# Patient Record
Sex: FEMALE | Race: BLACK OR AFRICAN AMERICAN | Employment: OTHER | URBAN - METROPOLITAN AREA
[De-identification: names, ages, dates, MRNs, and addresses within clinical notes are randomized per-mention and may not be internally consistent; named-entity substitution may affect disease eponyms.]

---

## 2017-03-13 ENCOUNTER — OFFICE VISIT (OUTPATIENT)
Dept: INTERNAL MEDICINE CLINIC | Age: 32
End: 2017-03-13

## 2017-03-13 VITALS
OXYGEN SATURATION: 100 % | BODY MASS INDEX: 27.62 KG/M2 | HEIGHT: 64 IN | HEART RATE: 72 BPM | SYSTOLIC BLOOD PRESSURE: 102 MMHG | RESPIRATION RATE: 20 BRPM | TEMPERATURE: 96.4 F | DIASTOLIC BLOOD PRESSURE: 61 MMHG | WEIGHT: 161.8 LBS

## 2017-03-13 DIAGNOSIS — R53.83 FATIGUE, UNSPECIFIED TYPE: ICD-10-CM

## 2017-03-13 DIAGNOSIS — E66.9 OBESITY (BMI 30.0-34.9): ICD-10-CM

## 2017-03-13 DIAGNOSIS — E04.1 THYROID NODULE: Primary | ICD-10-CM

## 2017-03-13 NOTE — PATIENT INSTRUCTIONS
Thyroid Nodules: Care Instructions  Your Care Instructions  Thyroid nodules are growths or lumps in the thyroid gland. Your thyroid is in the front of your neck. It controls how your body uses energy. You may have tests to see if the nodule is caused by cancer. Most nodules aren't cancer and don't cause problems. Many don't even need treatment. If you do have cancer, it can usually be cured. Treatment will probably include surgery. You may also get radioactive iodine treatment. If your thyroid can't make thyroid hormone after treatment, you can take a pill every day to replace the hormone. Follow-up care is a key part of your treatment and safety. Be sure to make and go to all appointments, and call your doctor if you are having problems. It's also a good idea to know your test results and keep a list of the medicines you take. How can you care for yourself at home? · Be safe with medicines. If you take thyroid hormone medicine:  ¨ Take it exactly as prescribed. Call your doctor if you think you are having a problem with your medicine. If you take the right amount and don't skip doses, you probably won't have side effects. ¨ Do not take it with calcium, vitamins, or iron. ¨ Try not to miss a dose. ¨ Do not take extra doses. This will not help you get better any faster. It may also cause side effects. ¨ Tell your doctor about any medicines you take. This includes over-the-counter medicines. ¨ Wear a medical alert bracelet or necklace that says you take thyroid hormones. You can buy these at most drugstores. When should you call for help? Call 911 anytime you think you may need emergency care. For example, call if:  · You lose consciousness. Call your doctor now or seek immediate medical care if:  · You have shortness of breath. Watch closely for changes in your health, and be sure to contact your doctor if:  · You have pain in your neck, jaw, or ear. · You have problems swallowing.   · You have a \"tickle\" in your throat. · You feel weak and tired. · You have nervousness, a fast heartbeat, hand tremors, problems sleeping, increased sweating, and weight loss. · You do not feel better even though you are taking your medicine. Where can you learn more? Go to http://jacobo-iveth.info/. Enter I963 in the search box to learn more about \"Thyroid Nodules: Care Instructions. \"  Current as of: July 28, 2016  Content Version: 11.1  © 4616-7713 [employer]. Care instructions adapted under license by [employer] (which disclaims liability or warranty for this information). If you have questions about a medical condition or this instruction, always ask your healthcare professional. [address] any warranty or liability for your use of this information.

## 2017-03-13 NOTE — MR AVS SNAPSHOT
Visit Information Date & Time Provider Department Dept. Phone Encounter #  
 3/13/2017  9:45 AM Maximilian Posadas MD [facility] Internal Medicine [phone] [id number] Upcoming Health Maintenance Date Due DTaP/Tdap/Td series (1 - Tdap) 10/19/2007 PAP AKA CERVICAL CYTOLOGY 10/19/2007 INFLUENZA AGE 9 TO ADULT 8/1/2016 Allergies as of 3/13/2017  Review Complete On: 3/13/2017 By: Maximilian Posadas MD  
 No Known Allergies Current Immunizations  Never Reviewed Name Date MMR Vaccine 1/7/2012  2:20 PM  
  
 Not reviewed this visit You Were Diagnosed With   
  
 Codes Comments Thyroid nodule    -  Primary ICD-10-CM: E04.1 ICD-9-CM: 241.0 Obesity (BMI 30.0-34.9)     ICD-10-CM: Y80.4 ICD-9-CM: 278.00 Fatigue, unspecified type     ICD-10-CM: R53.83 ICD-9-CM: 780.79 Vitals BP Pulse Temp Resp Height(growth percentile) Weight(growth percentile) 102/61 (BP 1 Location: Left arm, BP Patient Position: Sitting) 72 96.4 °F (35.8 °C) (Oral) 20 5' 4\" (1.626 m) 161 lb 12.8 oz (73.4 kg) LMP SpO2 BMI OB Status Smoking Status 02/26/2017 100% 27.77 kg/m2 Unknown Never Smoker Vitals History BMI and BSA Data Body Mass Index Body Surface Area  
 27.77 kg/m 2 1.82 m 2 Preferred Pharmacy Pharmacy Name Phone [id number], [place] - [address] [phone] Your Updated Medication List  
  
   
This list is accurate as of: 3/13/17 10:48 AM.  Always use your most recent med list.  
  
  
  
  
 cyclobenzaprine 10 mg tablet Commonly known as:  FLEXERIL Take 1 Tab by mouth three (3) times daily as needed for Muscle Spasm(s). oxyCODONE-acetaminophen 5-325 mg per tablet Commonly known as:  PERCOCET Take 1 Tab by mouth every four (4) hours as needed for Pain. Max Daily Amount: 6 Tabs. We Performed the Following CBC WITH AUTOMATED DIFF [88052 CPT(R)] METABOLIC PANEL, COMPREHENSIVE [71424 CPT(R)] T4, FREE [id number] CPT(R)] TSH 3RD GENERATION [04430 CPT(R)] To-Do List   
 05/13/2017 Imaging:  US THYROID/PARATHYROID/SOFT TISS Patient Instructions Thyroid Nodules: Care Instructions Your Care Instructions Thyroid nodules are growths or lumps in the thyroid gland. Your thyroid is in the front of your neck. It controls how your body uses energy. You may have tests to see if the nodule is caused by cancer. Most nodules aren't cancer and don't cause problems. Many don't even need treatment. If you do have cancer, it can usually be cured. Treatment will probably include surgery. You may also get radioactive iodine treatment. If your thyroid can't make thyroid hormone after treatment, you can take a pill every day to replace the hormone. Follow-up care is a key part of your treatment and safety. Be sure to make and go to all appointments, and call your doctor if you are having problems. It's also a good idea to know your test results and keep a list of the medicines you take. How can you care for yourself at home? · Be safe with medicines. If you take thyroid hormone medicine: ¨ Take it exactly as prescribed. Call your doctor if you think you are having a problem with your medicine. If you take the right amount and don't skip doses, you probably won't have side effects. ¨ Do not take it with calcium, vitamins, or iron. ¨ Try not to miss a dose. ¨ Do not take extra doses. This will not help you get better any faster. It may also cause side effects. ¨ Tell your doctor about any medicines you take. This includes over-the-counter medicines. ¨ Wear a medical alert bracelet or necklace that says you take thyroid hormones. You can buy these at most drugstores. When should you call for help? Call 911 anytime you think you may need emergency care. For example, call if: 
· You lose consciousness. Call your doctor now or seek immediate medical care if: 
· You have shortness of breath. Watch closely for changes in your health, and be sure to contact your doctor if: 
· You have pain in your neck, jaw, or ear. · You have problems swallowing. · You have a \"tickle\" in your throat. · You feel weak and tired. · You have nervousness, a fast heartbeat, hand tremors, problems sleeping, increased sweating, and weight loss. · You do not feel better even though you are taking your medicine. Where can you learn more? Go to http://jacobo-iveth.info/. Enter F597 in the search box to learn more about \"Thyroid Nodules: Care Instructions. \" Current as of: July 28, 2016 Content Version: 11.1 © 0904-5020 [employer]. Care instructions adapted under license by "[employer] (which disclaims liability or warranty for this information). If you have questions about a medical condition or this instruction, always ask your healthcare professional. [place] [address] any warranty or liability for your use of this information. Introducing [facility]! Laura Mccormick introduces [employer] patient portal. Now you can access parts of your medical record, email your doctor's office, and request medication refills online. 1. In your internet browser, go to [email or web address] 2. Click on the First Time User? Click Here link in the Sign In box. You will see the New Member Sign Up page. 3. Enter your [employer] Access Code exactly as it appears below. You will not need to use this code after youve completed the sign-up process. If you do not sign up before the expiration date, you must request a new code. · [employer] Access Code: [email or web address] Expires: 6/11/2017 10:48 AM 
 
4. Enter the last four digits of your Social Security Number (xxxx) and Date of Birth (mm/dd/yyyy) as indicated and click Submit.  You will be taken to the next sign-up page. 5. Create a [employer] ID. This will be your [employer] login ID and cannot be changed, so think of one that is secure and easy to remember. 6. Create a [employer] password. You can change your password at any time. 7. Enter your Password Reset Question and Answer. This can be used at a later time if you forget your password. 8. Enter your e-mail address. You will receive e-mail notification when new information is available in [address]. 9. Click Sign Up. You can now view and download portions of your medical record. 10. Click the Download Summary menu link to download a portable copy of your medical information. If you have questions, please visit the Frequently Asked Questions section of the [employer] website. Remember, [employer] is NOT to be used for urgent needs. For medical emergencies, dial 911. Now available from your iPhone and Android! Please provide this summary of care documentation to your next provider. Your primary care clinician is listed as Mandy Billingsley. If you have any questions after today's visit, please call [phone].

## 2017-03-13 NOTE — LETTER
4/21/2017 2:29 PM 
 
Ms. Price Londono [address] [facility] [address] Dear Price Caicedo: Please find your most recent results below. Resulted Orders CBC WITH AUTOMATED DIFF Result Value Ref Range WBC 4.2 3.4 - 10.8 x10E3/uL  
 RBC 4.05 3.77 - 5.28 x10E6/uL HGB 12.5 11.1 - 15.9 g/dL HCT 38.0 34.0 - 46.6 % MCV 94 79 - 97 fL  
 MCH 30.9 26.6 - 33.0 pg  
 MCHC 32.9 31.5 - 35.7 g/dL  
 RDW 13.3 12.3 - 15.4 % PLATELET 860 498 - 628 x10E3/uL NEUTROPHILS 56 % Lymphocytes 35 % MONOCYTES 8 % EOSINOPHILS 1 % BASOPHILS 0 %  
 ABS. NEUTROPHILS 2.4 1.4 - 7.0 x10E3/uL Abs Lymphocytes 1.5 0.7 - 3.1 x10E3/uL  
 ABS. MONOCYTES 0.4 0.1 - 0.9 x10E3/uL  
 ABS. EOSINOPHILS 0.0 0.0 - 0.4 x10E3/uL  
 ABS. BASOPHILS 0.0 0.0 - 0.2 x10E3/uL IMMATURE GRANULOCYTES 0 %  
 ABS. IMM. GRANS. 0.0 0.0 - 0.1 x10E3/uL Narrative Performed at:  [address]  [id number] : Carolina Pedersen MD, Phone:  [phone] METABOLIC PANEL, COMPREHENSIVE Result Value Ref Range Glucose 77 65 - 99 mg/dL BUN 10 6 - 20 mg/dL Creatinine 0.84 0.57 - 1.00 mg/dL GFR est non-AA 94 >59 mL/min/1.73 GFR est  >59 mL/min/1.73  
 BUN/Creatinine ratio 12 8 - 20 Sodium 141 134 - 144 mmol/L Potassium 4.8 3.5 - 5.2 mmol/L Chloride 103 96 - 106 mmol/L  
 CO2 20 18 - 29 mmol/L Calcium 9.5 8.7 - 10.2 mg/dL Protein, total 7.2 6.0 - 8.5 g/dL Albumin 4.6 3.5 - 5.5 g/dL GLOBULIN, TOTAL 2.6 1.5 - 4.5 g/dL A-G Ratio 1.8 1.2 - 2.2 Comment: **Please note reference interval change** Bilirubin, total 0.3 0.0 - 1.2 mg/dL Alk. phosphatase 49 39 - 117 IU/L  
 AST (SGOT) 61 (H) 0 - 40 IU/L  
 ALT (SGPT) 47 (H) 0 - 32 IU/L Narrative Performed at:  [address]  [id number] : Carolina Pedersen MD, Phone:  [phone] TSH 3RD GENERATION Result Value Ref Range TSH 0.649 0.450 - 4.500 uIU/mL Narrative Performed at:  [address]  [id number] : Sola Johnson MD, Phone:  [phone] T4, FREE Result Value Ref Range T4, Free 1.12 0.82 - 1.77 ng/dL Narrative Performed at:  [address]  [id number] : Sola Johnson MD, Phone:  [phone] RECOMMENDATIONS: 
[facility] your labs indicate that your liver functions are slightly elevated, please avoid tylenol and alcohol and we will repeat your labs. I have enclosed that lab slip for your convenience. Please call me if you have any questions: [phone] Sincerely, Jay Arreola MD

## 2017-03-13 NOTE — PROGRESS NOTES
Health Maintenance Due   Topic Date Due    DTaP/Tdap/Td series (1 - Tdap) 10/19/2007    PAP AKA CERVICAL CYTOLOGY  10/19/2007    INFLUENZA AGE 9 TO ADULT  08/01/2016       Chief Complaint   Patient presents with    Follow-up     6 month    Thyroid Problem     hx of thyromegaly       1. Have you been to the ER, urgent care clinic since your last visit? Hospitalized since your last visit? No    2. Have you seen or consulted any other health care providers outside of the [address] [place] [address] since your last visit? Include any pap smears or colon screening. No    3) Do you have an Advance Directive on file? no    4) Are you interested in receiving information on Advance Directives? NO      Patient is accompanied by self I have received verbal consent from [facility] to discuss any/all medical information while they are present in the room.

## 2017-03-13 NOTE — PROGRESS NOTES
HISTORY OF PRESENT ILLNESS  Ynes Brown is a 28 y.o. female here for follow up. Reports to have thyroid nodule. no pain. worred about t.  reports fatigue too. She has started watching diet. Katja Han lost 4 pound weight. Follow-up     Thyroid Problem         Review of Systems   Constitutional: Positive for malaise/fatigue. HENT: Negative. Eyes: Negative. Respiratory: Negative. Cardiovascular: Negative. Gastrointestinal: Negative. Genitourinary: Negative. Musculoskeletal: Negative. Negative for falls. Skin: Negative. Neurological: Negative. Endo/Heme/Allergies: Negative. Psychiatric/Behavioral: Negative. Physical Exam   Constitutional: She appears well-developed and well-nourished. She appears distressed. Neck: Normal range of motion. Neck supple. No JVD present. Thyromegaly present. aprox 2x2 cm size thyroid nodule felt on left side. smooth,soft,NT   Cardiovascular: Normal rate, regular rhythm, normal heart sounds and intact distal pulses. Pulmonary/Chest: Effort normal and breath sounds normal. No respiratory distress. She has no wheezes. She has no rales. Musculoskeletal:   Lower back;spine NT,paravertebral muscle spasm and tenderness. ROM restricted   Psychiatric: She has a normal mood and affect. Her behavior is normal.       ASSESSMENT and PLAN  Vicky Badillo was seen today for follow-up and thyroid problem. Diagnoses and all orders for this visit:    Thyroid nodule    Will check,  -     CBC WITH AUTOMATED DIFF  -     US THYROID/PARATHYROID/SOFT TISS; Future    Obesity (BMI 30.0-34.9)      1. Consume 3 meals per day, do not skip meals. 2. Chose low fat, portion controlled foods for snack and desserts such as low fat chocolate pudding, low fat flavored yogurt, 100 calorie snack packs, etc.   3. Increase moderate intensity exercise to 30 minutes at least 5 times per week.    4. Read nutrition facts labels to identify foods that are lean, extra lean and low fat  The patient is asked to make an attempt to improve diet and exercise patterns to aid in medical management of this problem. -     CBC WITH AUTOMATED DIFF  -     METABOLIC PANEL, COMPREHENSIVE    Fatigue, unspecified type    Will do,  -     CBC WITH AUTOMATED DIFF  -     METABOLIC PANEL, COMPREHENSIVE  -     TSH 3RD GENERATION  -     T4, FREE      Discussed expected course/resolution/complications of diagnosis in detail with patient. Medication risks/benefits/costs/interactions/alternatives discussed with patient. Pt was given an after visit summary which includes diagnoses, current medications & vitals. Pt expressed understanding with the diagnosis and plan.

## 2017-03-14 LAB
ALBUMIN SERPL-MCNC: 4.6 G/DL (ref 3.5–5.5)
ALBUMIN/GLOB SERPL: 1.8 {RATIO} (ref 1.2–2.2)
ALP SERPL-CCNC: 49 IU/L (ref 39–117)
ALT SERPL-CCNC: 47 IU/L (ref 0–32)
AST SERPL-CCNC: 61 IU/L (ref 0–40)
BASOPHILS # BLD AUTO: 0 X10E3/UL (ref 0–0.2)
BASOPHILS NFR BLD AUTO: 0 %
BILIRUB SERPL-MCNC: 0.3 MG/DL (ref 0–1.2)
BUN SERPL-MCNC: 10 MG/DL (ref 6–20)
BUN/CREAT SERPL: 12 (ref 8–20)
CALCIUM SERPL-MCNC: 9.5 MG/DL (ref 8.7–10.2)
CHLORIDE SERPL-SCNC: 103 MMOL/L (ref 96–106)
CO2 SERPL-SCNC: 20 MMOL/L (ref 18–29)
CREAT SERPL-MCNC: 0.84 MG/DL (ref 0.57–1)
EOSINOPHIL # BLD AUTO: 0 X10E3/UL (ref 0–0.4)
EOSINOPHIL NFR BLD AUTO: 1 %
ERYTHROCYTE [DISTWIDTH] IN BLOOD BY AUTOMATED COUNT: 13.3 % (ref 12.3–15.4)
GLOBULIN SER CALC-MCNC: 2.6 G/DL (ref 1.5–4.5)
GLUCOSE SERPL-MCNC: 77 MG/DL (ref 65–99)
HCT VFR BLD AUTO: 38 % (ref 34–46.6)
HGB BLD-MCNC: 12.5 G/DL (ref 11.1–15.9)
IMM GRANULOCYTES # BLD: 0 X10E3/UL (ref 0–0.1)
IMM GRANULOCYTES NFR BLD: 0 %
LYMPHOCYTES # BLD AUTO: 1.5 X10E3/UL (ref 0.7–3.1)
LYMPHOCYTES NFR BLD AUTO: 35 %
MCH RBC QN AUTO: 30.9 PG (ref 26.6–33)
MCHC RBC AUTO-ENTMCNC: 32.9 G/DL (ref 31.5–35.7)
MCV RBC AUTO: 94 FL (ref 79–97)
MONOCYTES # BLD AUTO: 0.4 X10E3/UL (ref 0.1–0.9)
MONOCYTES NFR BLD AUTO: 8 %
NEUTROPHILS # BLD AUTO: 2.4 X10E3/UL (ref 1.4–7)
NEUTROPHILS NFR BLD AUTO: 56 %
PLATELET # BLD AUTO: 201 X10E3/UL (ref 150–379)
POTASSIUM SERPL-SCNC: 4.8 MMOL/L (ref 3.5–5.2)
PROT SERPL-MCNC: 7.2 G/DL (ref 6–8.5)
RBC # BLD AUTO: 4.05 X10E6/UL (ref 3.77–5.28)
SODIUM SERPL-SCNC: 141 MMOL/L (ref 134–144)
T4 FREE SERPL-MCNC: 1.12 NG/DL (ref 0.82–1.77)
TSH SERPL DL<=0.005 MIU/L-ACNC: 0.65 UIU/ML (ref 0.45–4.5)
WBC # BLD AUTO: 4.2 X10E3/UL (ref 3.4–10.8)

## 2017-03-18 ENCOUNTER — HOSPITAL ENCOUNTER (OUTPATIENT)
Dept: ULTRASOUND IMAGING | Age: 32
Discharge: HOME | End: 2017-03-18
Attending: INTERNAL MEDICINE
Payer: COMMERCIAL

## 2017-03-18 DIAGNOSIS — E04.1 THYROID NODULE: ICD-10-CM

## 2017-03-18 PROCEDURE — 76536 US EXAM OF HEAD AND NECK: CPT

## 2017-03-18 NOTE — LETTER
4/21/2017 2:28 PM 
 
Ms. Nikos Londono [address] [facility] [address] Dear Nikos Ramos: Please find your most recent results below. Resulted Orders US THYROID/PARATHYROID/SOFT TISS Narrative EXAM:  US THYROID/PARATHYROID/SOFT TISS INDICATION: Thyroid nodule. COMPARISON: None. TECHNIQUE: Real-time sonography of the thyroid gland was performed with a high 
frequency linear transducer. Multiple static images were obtained. FINDINGS: 
The thyroid is diffusely enlarged. Additionally, within the medial aspect of the 
left lobe, there is a dominant thyroid nodule measuring 2.2 x 2.1 x 1.2 cm, with 
mixed internal echogenicity; portions are isoechoic to background, and other 
portions are anechoic. The right lobe measures 5.5 x 2.3 x 1.8 cm and the left lobe measures 5.7 x 2.5 
x 2.1 cm. The isthmus measures 8 mm. Impression IMPRESSION:  
1. Diffuse enlargement of the thyroid, consistent with goiter. 2. Additionally, dominant nodule in the left lobe, mixed solid and cystic, 
measuring 2.2 cm. RECOMMENDATIONS: 
Nikos Ramos your ultrasound indicates a goiter and cyst, as per your conversation with my nurse, please make appointment with Dr Dali Loya, endocrinology. Please call me if you have any questions: [phone] Sincerely, [facility] 1

## 2017-04-21 DIAGNOSIS — R74.01 ELEVATED AST (SGOT): Primary | ICD-10-CM

## 2017-04-21 DIAGNOSIS — R74.01 ELEVATED ALT MEASUREMENT: ICD-10-CM

## 2017-04-21 DIAGNOSIS — E01.0 THYROMEGALY: Primary | ICD-10-CM

## 2017-05-10 ENCOUNTER — TELEPHONE (OUTPATIENT)
Dept: INTERNAL MEDICINE CLINIC | Age: 32
End: 2017-05-10

## 2017-05-10 NOTE — TELEPHONE ENCOUNTER
Patient called stating dE Paul referred her to Thuy Gaona he didn't have any appt's til sept want to know if she can be referred to someone else she can be reached at [phone]

## 2017-05-21 DIAGNOSIS — R74.01 ELEVATED ALT MEASUREMENT: ICD-10-CM

## 2017-05-21 DIAGNOSIS — R74.01 ELEVATED AST (SGOT): ICD-10-CM

## 2017-08-03 ENCOUNTER — OFFICE VISIT (OUTPATIENT)
Dept: ENDOCRINOLOGY | Age: 32
End: 2017-08-03

## 2017-08-03 VITALS
BODY MASS INDEX: 23.93 KG/M2 | HEART RATE: 65 BPM | WEIGHT: 140.2 LBS | DIASTOLIC BLOOD PRESSURE: 61 MMHG | HEIGHT: 64 IN | SYSTOLIC BLOOD PRESSURE: 92 MMHG

## 2017-08-03 DIAGNOSIS — E04.1 THYROID NODULE: Primary | ICD-10-CM

## 2017-08-03 NOTE — PATIENT INSTRUCTIONS
Thyroid Nodules: Care Instructions  Your Care Instructions  Thyroid nodules are growths or lumps in the thyroid gland. Your thyroid is in the front of your neck. It controls how your body uses energy. You may have tests to see if the nodule is caused by cancer. Most nodules aren't cancer and don't cause problems. Many don't even need treatment. If you do have cancer, it can usually be cured. Treatment will probably include surgery. You may also get radioactive iodine treatment. If your thyroid can't make thyroid hormone after treatment, you can take a pill every day to replace the hormone. Follow-up care is a key part of your treatment and safety. Be sure to make and go to all appointments, and call your doctor if you are having problems. It's also a good idea to know your test results and keep a list of the medicines you take. How can you care for yourself at home? · Be safe with medicines. If you take thyroid hormone medicine:  ¨ Take it exactly as prescribed. Call your doctor if you think you are having a problem with your medicine. If you take the right amount and don't skip doses, you probably won't have side effects. ¨ Do not take it with calcium, vitamins, or iron. ¨ Try not to miss a dose. ¨ Do not take extra doses. This will not help you get better any faster. It may also cause side effects. ¨ Tell your doctor about any medicines you take. This includes over-the-counter medicines. ¨ Wear a medical alert bracelet or necklace that says you take thyroid hormones. You can buy these at most drugstores. When should you call for help? Call 911 anytime you think you may need emergency care. For example, call if:  · You lose consciousness. Call your doctor now or seek immediate medical care if:  · You have shortness of breath. Watch closely for changes in your health, and be sure to contact your doctor if:  · You have pain in your neck, jaw, or ear. · You have problems swallowing.   · You feel weak and tired. · You have nervousness, a fast heartbeat, hand tremors, problems sleeping, increased sweating, and weight loss. · You do not feel better even though you are taking your medicine. Where can you learn more? Go to http://jacobo-iveth.info/. Enter J393 in the search box to learn more about \"Thyroid Nodules: Care Instructions. \"  Current as of: January 31, 2017  Content Version: 11.3  © 3202-3580 [employer]. Care instructions adapted under license by [employer] (which disclaims liability or warranty for this information). If you have questions about a medical condition or this instruction, always ask your healthcare professional. [place] [address] any warranty or liability for your use of this information. Fine-Needle Thyroid Biopsy: Before Your Procedure  What is a needle thyroid biopsy? During a thyroid biopsy, your doctor uses a thin needle to remove a small sample of tissue from your thyroid gland. You may be having the biopsy to find what is causing a lump or growth in your thyroid. The biopsy causes very little pain. But your doctor may need to put the needle into your thyroid more than once. This is done to be sure enough fluid and tissue is taken for the test.  The doctor then looks at the tissue sample under a microscope for cancer, infection, or other thyroid problems. The biopsy is done in a hospital, a clinic, or your doctor's office. During the test, you will lie on your back with a pillow under your shoulders. Your head will be tipped backward and your neck extended. This position pushes the thyroid gland forward. This makes it easier to do the biopsy. You may be given medicine to help you relax. Your doctor may use an ultrasound to guide the placement of the needle. It is important to lie very still during the biopsy. Do not cough, talk, or swallow when the needle is in place.   In some cases, thyroid surgery may be needed if a needle biopsy doesn't give a clear result. This would be done at a different time. In this surgery, the doctor takes a tissue sample through a cut (incision) in the skin. Follow-up care is a key part of your treatment and safety. Be sure to make and go to all appointments, and call your doctor if you are having problems. It's also a good idea to know your test results and keep a list of the medicines you take. What happens before the procedure? Procedures can be stressful. This information will help you understand what you can expect. And it will help you safely prepare for your procedure. You do not need to do anything before your biopsy. You will be awake during the biopsy. Preparing for the procedure  · Understand exactly what procedure is planned, along with the risks, benefits, and other options. · Tell your doctors ALL the medicines, vitamins, supplements, and herbal remedies you take. Some of these can increase the risk of bleeding or interact with anesthesia. · If you take blood thinners, such as warfarin (Coumadin), clopidogrel (Plavix), or aspirin, be sure to talk to your doctor. He or she will tell you if you should stop taking these medicines before your procedure. Make sure that you understand exactly what your doctor wants you to do. · Your doctor will tell you which medicines to take or stop before your procedure. You may need to stop taking certain medicines a week or more before the procedure. So talk to your doctor as soon as you can. · If you have an advance directive, let your doctor know. It may include a living will and a durable power of  for health care. Bring a copy to the hospital. If you don't have one, you may want to prepare one. It lets your doctor and loved ones know your health care wishes. Doctors advise that everyone prepare these papers before any type of surgery or procedure. What happens on the day of the procedure?   · Follow the instructions exactly about when to stop eating and drinking. If you don't, your procedure may be canceled. If your doctor told you to take your medicines on the day of the procedure, take them with only a sip of water. · Take a bath or shower before you come in for your procedure. Do not apply lotions, perfumes, deodorants, or nail polish. · Take off all jewelry and piercings. And take out contact lenses, if you wear them. At the hospital or surgery center  · Bring a picture ID. · The procedure will take about 5 to 10 minutes. Going home  · You may need to stay in the hospital overnight. · Be sure you have someone to drive you home. Anesthesia and pain medicine make it unsafe for you to drive. · You will be given more specific instructions about recovering from your procedure. They will cover things like diet, wound care, follow-up care, driving, and getting back to your normal routine. When should you call your doctor? · You have questions or concerns. · You don't understand how to prepare for your procedure. · You become ill before the procedure (such as fever, flu, or a cold). · You need to reschedule or have changed your mind about having the procedure. Where can you learn more? Go to http://jacobo-iveth.info/. Enter J510 in the search box to learn more about \"Fine-Needle Thyroid Biopsy: Before Your Procedure. \"  Current as of: July 28, 2016  Content Version: 11.3  © 6073-8440 [employer]. Care instructions adapted under license by [employer] (which disclaims liability or warranty for this information). If you have questions about a medical condition or this instruction, always ask your healthcare professional. [address] any warranty or liability for your use of this information.

## 2017-08-03 NOTE — LETTER
8/3/2017 1:40 PM 
 
Patient:  Johan Dupont YOB: 1985 Date of Visit: 8/3/2017 Dear Elizabeth Vazquez MD 
[address] [address] VIA In Basket 
 : Thank you for referring Ms. Johan Dupont to me for evaluation/treatment. Below are the relevant portions of my assessment and plan of care. If you have questions, please do not hesitate to call me. I look forward to following Ms. Praveen Swain along with you. Sincerely, Paulino Myers MD
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
## 2017-08-03 NOTE — MR AVS SNAPSHOT
Visit Information Date & Time Provider Department Dept. Phone Encounter #  
 8/3/2017  1:30 PM Nunu Miller, [address] [facility] Diabetes and Endocrinology [phone] Follow-up Instructions Return in about 6 months (around 2/3/2018). Upcoming Health Maintenance Date Due DTaP/Tdap/Td series (1 - Tdap) 10/19/2007 PAP AKA CERVICAL CYTOLOGY 10/19/2007 INFLUENZA AGE 9 TO ADULT 8/1/2017 Allergies as of 8/3/2017  Review Complete On: 8/3/2017 By: Jeanette Hearn LPN No Known Allergies Current Immunizations  Never Reviewed Name Date MMR Vaccine 1/7/2012  2:20 PM  
  
 Not reviewed this visit You Were Diagnosed With   
  
 Codes Comments Thyroid nodule    -  Primary ICD-10-CM: E04.1 ICD-9-CM: 241.0 Vitals BP Pulse Height(growth percentile) Weight(growth percentile) BMI OB Status 92/61 65 5' 4\" (1.626 m) 140 lb 3.2 oz (63.6 kg) 24.07 kg/m2 Unknown Smoking Status Never Smoker Vitals History BMI and BSA Data Body Mass Index Body Surface Area 24.07 kg/m 2 1.69 m 2 Preferred Pharmacy Pharmacy Name Phone [id number] [address] [phone] Your Updated Medication List  
  
Notice  As of 8/3/2017  1:54 PM  
 You have not been prescribed any medications. Follow-up Instructions Return in about 6 months (around 2/3/2018). To-Do List   
 08/03/2017 Imaging:  US GUIDE FINE NDL ASP W IMAGE Patient Instructions Thyroid Nodules: Care Instructions Your Care Instructions Thyroid nodules are growths or lumps in the thyroid gland. Your thyroid is in the front of your neck. It controls how your body uses energy. You may have tests to see if the nodule is caused by cancer. Most nodules aren't cancer and don't cause problems. Many don't even need treatment. If you do have cancer, it can usually be cured. Treatment will probably include surgery. You may also get radioactive iodine treatment. If your thyroid can't make thyroid hormone after treatment, you can take a pill every day to replace the hormone. Follow-up care is a key part of your treatment and safety. Be sure to make and go to all appointments, and call your doctor if you are having problems. It's also a good idea to know your test results and keep a list of the medicines you take. How can you care for yourself at home? · Be safe with medicines. If you take thyroid hormone medicine: ¨ Take it exactly as prescribed. Call your doctor if you think you are having a problem with your medicine. If you take the right amount and don't skip doses, you probably won't have side effects. ¨ Do not take it with calcium, vitamins, or iron. ¨ Try not to miss a dose. ¨ Do not take extra doses. This will not help you get better any faster. It may also cause side effects. ¨ Tell your doctor about any medicines you take. This includes over-the-counter medicines. ¨ Wear a medical alert bracelet or necklace that says you take thyroid hormones. You can buy these at most drugstores. When should you call for help? Call 911 anytime you think you may need emergency care. For example, call if: 
· You lose consciousness. Call your doctor now or seek immediate medical care if: 
· You have shortness of breath. Watch closely for changes in your health, and be sure to contact your doctor if: 
· You have pain in your neck, jaw, or ear. · You have problems swallowing. · You feel weak and tired. · You have nervousness, a fast heartbeat, hand tremors, problems sleeping, increased sweating, and weight loss. · You do not feel better even though you are taking your medicine. Where can you learn more? Go to http://jacobo-iveth.info/.  
Enter F781 in the search box to learn more about \"Thyroid Nodules: Care Instructions. \" Current as of: January 31, 2017 Content Version: 11.3 © 9602-9921 [employer]. Care instructions adapted under license by [employer] (which disclaims liability or warranty for this information). If you have questions about a medical condition or this instruction, always ask your healthcare professional. [address] any warranty or liability for your use of this information. Fine-Needle Thyroid Biopsy: Before Your Procedure What is a needle thyroid biopsy? During a thyroid biopsy, your doctor uses a thin needle to remove a small sample of tissue from your thyroid gland. You may be having the biopsy to find what is causing a lump or growth in your thyroid. The biopsy causes very little pain. But your doctor may need to put the needle into your thyroid more than once. This is done to be sure enough fluid and tissue is taken for the test. 
The doctor then looks at the tissue sample under a microscope for cancer, infection, or other thyroid problems. The biopsy is done in a hospital, a clinic, or your doctor's office. During the test, you will lie on your back with a pillow under your shoulders. Your head will be tipped backward and your neck extended. This position pushes the thyroid gland forward. This makes it easier to do the biopsy. You may be given medicine to help you relax. Your doctor may use an ultrasound to guide the placement of the needle. It is important to lie very still during the biopsy. Do not cough, talk, or swallow when the needle is in place. In some cases, thyroid surgery may be needed if a needle biopsy doesn't give a clear result. This would be done at a different time. In this surgery, the doctor takes a tissue sample through a cut (incision) in the skin. Follow-up care is a key part of your treatment and safety.  Be sure to make and go to all appointments, and call your doctor if you are having problems. It's also a good idea to know your test results and keep a list of the medicines you take. What happens before the procedure? Procedures can be stressful. This information will help you understand what you can expect. And it will help you safely prepare for your procedure. You do not need to do anything before your biopsy. You will be awake during the biopsy. Preparing for the procedure · Understand exactly what procedure is planned, along with the risks, benefits, and other options. · Tell your doctors ALL the medicines, vitamins, supplements, and herbal remedies you take. Some of these can increase the risk of bleeding or interact with anesthesia. · If you take blood thinners, such as warfarin (Coumadin), clopidogrel (Plavix), or aspirin, be sure to talk to your doctor. He or she will tell you if you should stop taking these medicines before your procedure. Make sure that you understand exactly what your doctor wants you to do. · Your doctor will tell you which medicines to take or stop before your procedure. You may need to stop taking certain medicines a week or more before the procedure. So talk to your doctor as soon as you can. · If you have an advance directive, let your doctor know. It may include a living will and a durable power of  for health care. Bring a copy to the hospital. If you don't have one, you may want to prepare one. It lets your doctor and loved ones know your health care wishes. Doctors advise that everyone prepare these papers before any type of surgery or procedure. What happens on the day of the procedure? · Follow the instructions exactly about when to stop eating and drinking. If you don't, your procedure may be canceled. If your doctor told you to take your medicines on the day of the procedure, take them with only a sip of water. · Take a bath or shower before you come in for your procedure. Do not apply lotions, perfumes, deodorants, or nail polish. · Take off all jewelry and piercings. And take out contact lenses, if you wear them. At the hospital or surgery center · Bring a picture ID. · The procedure will take about 5 to 10 minutes. Going home · You may need to stay in the hospital overnight. · Be sure you have someone to drive you home. Anesthesia and pain medicine make it unsafe for you to drive. · You will be given more specific instructions about recovering from your procedure. They will cover things like diet, wound care, follow-up care, driving, and getting back to your normal routine. When should you call your doctor? · You have questions or concerns. · You don't understand how to prepare for your procedure. · You become ill before the procedure (such as fever, flu, or a cold). · You need to reschedule or have changed your mind about having the procedure. Where can you learn more? Go to http://jacobo-iveth.info/. Enter J510 in the search box to learn more about \"Fine-Needle Thyroid Biopsy: Before Your Procedure. \" Current as of: July 28, 2016 Content Version: 11.3 © 8466-6594 [employer]wise[employer]. Care instructions adapted under license by [employer] (which disclaims liability or warranty for this information). If you have questions about a medical condition or this instruction, always ask your healthcare professional. [place] [address] any warranty or liability for your use of this information. Introducing [facility]! [employer] introduces [employer] patient portal. Now you can access parts of your medical record, email your doctor's office, and request medication refills online. 1. In your internet browser, go to [email or web address] 2. Click on the First Time User? Click Here link in the Sign In box. You will see the New Member Sign Up page. 3. Enter your [employer] Access Code exactly as it appears below.  You will not need to use this code after youve completed the sign-up process. If you do not sign up before the expiration date, you must request a new code. · [employer] Access Code: [facility] Expires: 11/1/2017  1:54 PM 
 
4. Enter the last four digits of your Social Security Number (xxxx) and Date of Birth (mm/dd/yyyy) as indicated and click Submit. You will be taken to the next sign-up page. 5. Create a [employer] ID. This will be your [employer] login ID and cannot be changed, so think of one that is secure and easy to remember. 6. Create a [employer] password. You can change your password at any time. 7. Enter your Password Reset Question and Answer. This can be used at a later time if you forget your password. 8. Enter your e-mail address. You will receive e-mail notification when new information is available in [address]. 9. Click Sign Up. You can now view and download portions of your medical record. 10. Click the Download Summary menu link to download a portable copy of your medical information. If you have questions, please visit the Frequently Asked Questions section of the [employer] website. Remember, [employer] is NOT to be used for urgent needs. For medical emergencies, dial 911. Now available from your iPhone and Android! Please provide this summary of care documentation to your next provider. Your primary care clinician is listed as Haresh Schafer. If you have any questions after today's visit, please call [phone].

## 2017-08-09 ENCOUNTER — HOSPITAL ENCOUNTER (OUTPATIENT)
Dept: ULTRASOUND IMAGING | Age: 32
Discharge: HOME | End: 2017-08-09
Attending: INTERNAL MEDICINE
Payer: COMMERCIAL

## 2017-08-09 DIAGNOSIS — E04.1 THYROID NODULE: ICD-10-CM

## 2017-08-09 PROCEDURE — 10022 US GUIDE FINE NDL ASP W IMAGE: CPT

## 2017-08-09 PROCEDURE — 88173 CYTOPATH EVAL FNA REPORT: CPT | Performed by: INTERNAL MEDICINE

## 2017-08-09 PROCEDURE — 74011000250 HC RX REV CODE- 250: Performed by: RADIOLOGY

## 2017-08-09 PROCEDURE — 88172 CYTP DX EVAL FNA 1ST EA SITE: CPT | Performed by: INTERNAL MEDICINE

## 2017-08-09 RX ORDER — LIDOCAINE HYDROCHLORIDE 10 MG/ML
10 INJECTION INFILTRATION; PERINEURAL
Status: COMPLETED | OUTPATIENT
Start: 2017-08-09 | End: 2017-08-09

## 2017-08-09 RX ADMIN — LIDOCAINE HYDROCHLORIDE 2 ML: 10 INJECTION, SOLUTION INFILTRATION; PERINEURAL at 11:00

## 2017-08-09 NOTE — DISCHARGE INSTRUCTIONS
[address] [facility]  Department of Radiology  [phone] Ultrasound Department  [phone] Emergency Department    BIOPSY DISCHARGE INSTRUCTIONS for [facility]    General Instructions:  - A biopsy is the removal of a small piece of tissue for microscopic examination or testing.  - Healthy tissue can be obtained for the purpose of tissue-type matching for transplants. - Unhealthy tissues are more commonly biopsied to diagnose disease. General Biopsy:  - A mass can grow in any area of the body, and we are taking a specimen as ordered by your doctor. - The risks are the same. They include bleeding, pain, and infection. Home Care Instructions:  - You may resume your regular diet and medication regimen. - You may use over the counter acetaminophen (Tylenol) or ibuprofen (Advil) for the soreness. - You may apply an ice pack to the affected area for 20-30 minutes at time for the first 24 hours. -- After that, you may apply a heat pack. - You may remove the bandaid(s) tonight. - You may take a shower tonight. - Please keep the site clean and dry for 24-48 hours. - Do not soak in any kind of water (bath tub, hot tub, pool, ocean, etc) for 24-48 hours. - Do not participate in any kind of activity making you vigorously sweat for 24-48 hours. - Do not use any moisturizer or makeup on the site for 24-48 hours. Call If:  - You should call your doctor if you have any questions or concerns about the biopsy site. - Call if you should have increased pain, fever, redness, drainage, or bleeding more than a small spot on the bandage. Follow-Up Instructions:  - Please see your doctor as she has requested.

## 2017-08-11 ENCOUNTER — TELEPHONE (OUTPATIENT)
Dept: ENDOCRINOLOGY | Age: 32
End: 2017-08-11

## 2017-08-11 NOTE — TELEPHONE ENCOUNTER
Please call this patient of Dr. General Sierra and let her know that her thyroid biopsy came back benign without any evidence of cancer. Dr. Inez Fitzgerald is on vacation until next week and will determine if any other follow up is needed at that time but I wanted her to have this result today to reassure her.

## 2019-10-31 ENCOUNTER — OFFICE VISIT (OUTPATIENT)
Dept: INTERNAL MEDICINE CLINIC | Age: 34
End: 2019-10-31

## 2019-10-31 VITALS
DIASTOLIC BLOOD PRESSURE: 62 MMHG | HEART RATE: 75 BPM | BODY MASS INDEX: 25.95 KG/M2 | SYSTOLIC BLOOD PRESSURE: 92 MMHG | WEIGHT: 152 LBS | TEMPERATURE: 97.3 F | OXYGEN SATURATION: 98 % | RESPIRATION RATE: 18 BRPM | HEIGHT: 64 IN

## 2019-10-31 DIAGNOSIS — D64.9 ANEMIA, UNSPECIFIED TYPE: ICD-10-CM

## 2019-10-31 DIAGNOSIS — R53.83 FATIGUE, UNSPECIFIED TYPE: Primary | ICD-10-CM

## 2019-10-31 DIAGNOSIS — Z00.00 HEALTH CARE MAINTENANCE: ICD-10-CM

## 2019-10-31 NOTE — PROGRESS NOTES
Health Maintenance Due   Topic Date Due    DTaP/Tdap/Td series (1 - Tdap) 10/19/2007    PAP AKA CERVICAL CYTOLOGY  10/19/2007    Influenza Age 5 to Adult  08/01/2019       Chief Complaint   Patient presents with    Complete Physical    Thyroid Problem     hx of thyroid nodule    Fatigue     pt states not sure if it is fatigue or depression       1. Have you been to the ER, urgent care clinic since your last visit? Hospitalized since your last visit? No    2. Have you seen or consulted any other health care providers outside of the [address] [place] [address] since your last visit? Include any pap smears or colon screening. No    3) Do you have an Advance Directive on file? no    4) Are you interested in receiving information on Advance Directives? NO      Patient is accompanied by self I have received verbal consent from Cheyanne Mims to discuss any/all medical information while they are present in the room.

## 2019-10-31 NOTE — PROGRESS NOTES
HISTORY OF PRESENT ILLNESS  Crow Webb is a 36 y.o. female. That is a patient of Dr. Rosalina Proctor with the history of a benign thyroid nodule, and anemia. That presents today for a physical.  She comes in today complaining of being very tired, over the last 3 months. Her  is incarcerated and she takes care of her daughter, but has family support since January. Has gained 15-20 lbs since January. Feels like she has been involved with events and denies being depressed. Denies wanting to harm herself or others. States she is still very active with thoes around her and communicates with her spouse weekly. Saw her GYN last month for a pap and physical. Complains of heavy periods that last 9-10 days at time. Minimal cramping, no fevers or chills. Is complaining of headaches, but rest makes them go away. Complains of blurred vision when reading small print. . Is going to see her opthalmology. Does not like to take medication if she is able. Prefers natural and herbal supplements. Refuses flu shot    Visit Vitals  BP 92/62 (BP 1 Location: Left arm, BP Patient Position: Sitting)   Pulse 75   Temp 97.3 °F (36.3 °C) (Oral)   Resp 18   Ht 5' 4\" (1.626 m)   Wt 152 lb (68.9 kg)   LMP 10/31/2019   SpO2 98%   BMI 26.09 kg/m²     Past Medical History:   Diagnosis Date    Abnormal Pap smear     Follow-ups normal    Herpes simplex without mention of complication     HX OTHER MEDICAL     Endometriosus     No outpatient encounter medications on file as of 10/31/2019. No facility-administered encounter medications on file as of 10/31/2019. HPI    Review of Systems   Constitutional: Positive for malaise/fatigue. Negative for chills and fever. HENT: Negative. Eyes: Positive for blurred vision. Respiratory: Negative. Cardiovascular: Negative. Gastrointestinal: Negative. Genitourinary: Negative. Musculoskeletal: Negative. Neurological: Positive for headaches. Negative for dizziness. Physical Exam   Constitutional: She is oriented to person, place, and time. She appears well-developed. HENT:   Head: Normocephalic. Right Ear: External ear normal.   Left Ear: External ear normal.   Eyes: Pupils are equal, round, and reactive to light. Neck: Normal range of motion. Left thyroid nodule. Not mobile    Cardiovascular: Normal rate and regular rhythm. Pulmonary/Chest: Effort normal and breath sounds normal.   Abdominal: Soft. Bowel sounds are normal.   Musculoskeletal: Normal range of motion. Neurological: She is alert and oriented to person, place, and time. Skin: Skin is warm. Psychiatric: She has a normal mood and affect. Nursing note and vitals reviewed. ASSESSMENT and PLAN  Diagnoses and all orders for this visit:    1. Fatigue, unspecified type    2. Anemia, unspecified type    3. Health care maintenance  -     METABOLIC PANEL, COMPREHENSIVE  -     CBC WITH AUTOMATED DIFF  -     LIPID PANEL  -     VITAMIN D, 25 HYDROXY  -     T3 TOTAL  -     T4, FREE      Follow-up and Dispositions    · Return in about 4 weeks (around 11/28/2019), or if symptoms worsen or fail to improve.      Would like to try a month of getting back into exercise and daily activities before needing additional help.    lab results and schedule of future lab studies reviewed with patient  reviewed diet, exercise and weight control

## 2020-01-27 ENCOUNTER — OFFICE VISIT (OUTPATIENT)
Dept: INTERNAL MEDICINE CLINIC | Age: 35
End: 2020-01-27

## 2020-01-27 VITALS
SYSTOLIC BLOOD PRESSURE: 118 MMHG | TEMPERATURE: 98.1 F | RESPIRATION RATE: 20 BRPM | HEIGHT: 64 IN | DIASTOLIC BLOOD PRESSURE: 70 MMHG | HEART RATE: 91 BPM | BODY MASS INDEX: 24.28 KG/M2 | WEIGHT: 142.2 LBS | OXYGEN SATURATION: 96 %

## 2020-01-27 DIAGNOSIS — R09.89 CHEST CONGESTION: ICD-10-CM

## 2020-01-27 DIAGNOSIS — F41.9 ANXIETY: ICD-10-CM

## 2020-01-27 DIAGNOSIS — R11.2 INTRACTABLE VOMITING WITH NAUSEA, UNSPECIFIED VOMITING TYPE: ICD-10-CM

## 2020-01-27 DIAGNOSIS — R19.7 DIARRHEA, UNSPECIFIED TYPE: ICD-10-CM

## 2020-01-27 DIAGNOSIS — F43.9 STRESS: ICD-10-CM

## 2020-01-27 DIAGNOSIS — R10.9 STOMACH PAIN: Primary | ICD-10-CM

## 2020-01-27 RX ORDER — AMOXICILLIN 500 MG/1
500 CAPSULE ORAL 2 TIMES DAILY
Qty: 10 CAP | Refills: 0 | Status: SHIPPED | OUTPATIENT
Start: 2020-01-27 | End: 2020-04-21 | Stop reason: ALTCHOICE

## 2020-01-27 RX ORDER — LORAZEPAM 0.5 MG/1
0.25 TABLET ORAL
Qty: 30 TAB | Refills: 0 | Status: SHIPPED | OUTPATIENT
Start: 2020-01-27 | End: 2020-11-18 | Stop reason: ALTCHOICE

## 2020-01-27 NOTE — PROGRESS NOTES
HISTORY OF PRESENT ILLNESS  Anmol Domingo is a 36 y.o. female. That is a patient of Dr. Selene Gomez with the history of a benign thyroid nodule, and anemia. That presents today for complaints of abdominal pain with diarrhea. Reports that over the last few months she has visit the ER. They gave her IV fluids, but never a CT scan. Patient reports that she has had some nausea and vomiting. Has changes her diet, but she not seen any differences. Has taken meat out. Denies any melena. States that she has lost over 10 lbs in the few months without trying   She comes in today complaining of being very tired, over the last 3 months. Her   Was recently incarcerated and she takes care of her daughter, but has family support since January. States that he has now come home. Has gained 15-20 lbs since January. Feels like she has been involved with events and denies being depressed. Denies wanting to harm herself or others. States she is still very active with thoes around her and communicates with her spouse weekly. States that she is having episode of lashing out because of wanting to get all things done. States that she is overwhelmed and is constantly having to be reminded by family to make time for them. 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 States that external factor contribute to her anxiety. States that she has been having increasingly chest tightness and congestion. Reports brown to green sputum over the last few weeks that has not gotten any better. Took Tylenol a few times.  Denies any fever, chills or sore throat  Denies CP  Visit Vitals  /70 (BP 1 Location: Right arm, BP Patient Position: Sitting)   Pulse 91   Temp 98.1 °F (36.7 °C) (Oral)   Resp 20   Ht 5' 4\" (1.626 m)   Wt 142 lb 3.2 oz (64.5 kg)   LMP 01/02/2020 (Exact Date)   SpO2 96%   BMI 24.41 kg/m²     Past Medical History:   Diagnosis Date    Abnormal Pap smear     Follow-ups normal    Herpes simplex without mention of complication     HX OTHER MEDICAL     Endometriosus     Past Surgical History:   Procedure Laterality Date    HX OTHER SURGICAL  2005    Exploratory  laprascopic for endometriosus     Social History     Socioeconomic History    Marital status: SINGLE     Spouse name: Not on file    Number of children: Not on file    Years of education: Not on file    Highest education level: Not on file   Occupational History    Not on file   Social Needs    Financial resource strain: Not on file    Food insecurity:     Worry: Not on file     Inability: Not on file    Transportation needs:     Medical: Not on file     Non-medical: Not on file   Tobacco Use    Smoking status: Never Smoker    Smokeless tobacco: Never Used   Substance and Sexual Activity    Alcohol use: Yes     Alcohol/week: 0.0 standard drinks     Comment: Occasionally    Drug use: No    Sexual activity: Yes     Partners: Male     Birth control/protection: None     Comment: has one child 1 yrs old.    Lifestyle    Physical activity:     Days per week: Not on file     Minutes per session: Not on file    Stress: Not on file   Relationships    Social connections:     Talks on phone: Not on file     Gets together: Not on file     Attends Jehovah's witness service: Not on file     Active member of club or organization: Not on file     Attends meetings of clubs or organizations: Not on file     Relationship status: Not on file    Intimate partner violence:     Fear of current or ex partner: Not on file     Emotionally abused: Not on file     Physically abused: Not on file     Forced sexual activity: Not on file   Other Topics Concern    Not on file   Social History Narrative    Not on file     Family History   Problem Relation Age of Onset    Cancer Maternal Grandmother         Breast    No Known Problems Mother     Arthritis-osteo Father     No Known Problems Sister     No Known Problems Brother     No Known Problems Sister Outpatient Encounter Medications as of 1/27/2020   Medication Sig Dispense Refill    guaifenesin/dextromethorphan (ROBITUSSIN-DM PO) Take  by mouth.  LORazepam (ATIVAN) 0.5 mg tablet Take 0.5 Tabs by mouth every eight (8) hours as needed for Anxiety. Max Daily Amount: 0.75 mg. 30 Tab 0    amoxicillin (AMOXIL) 500 mg capsule Take 1 Cap by mouth two (2) times a day. 10 Cap 0     No facility-administered encounter medications on file as of 1/27/2020. HPI    Review of Systems   Constitutional: Negative for chills and fever. HENT: Positive for congestion. Negative for sore throat. Eyes: Negative. Respiratory: Positive for cough and sputum production. Negative for wheezing. Gastrointestinal: Positive for abdominal pain, diarrhea, nausea and vomiting. Negative for blood in stool. Musculoskeletal: Positive for joint pain. Negative for falls. Neurological: Negative. Psychiatric/Behavioral: Negative for depression, substance abuse and suicidal ideas. The patient is nervous/anxious. The patient does not have insomnia. Physical Exam  Vitals signs and nursing note reviewed. Constitutional:       Appearance: Normal appearance. HENT:      Head: Normocephalic and atraumatic. Neck:      Musculoskeletal: Neck supple. Cardiovascular:      Rate and Rhythm: Normal rate and regular rhythm. Pulses: Normal pulses. Heart sounds: Normal heart sounds. Pulmonary:      Effort: Pulmonary effort is normal.      Breath sounds: Normal breath sounds. Abdominal:      General: Bowel sounds are normal. There is no distension. Palpations: Abdomen is soft. Tenderness: There is no tenderness. There is no right CVA tenderness or left CVA tenderness. Musculoskeletal:         General: No swelling or tenderness. Skin:     General: Skin is warm. Neurological:      Mental Status: She is oriented to person, place, and time. Psychiatric:         Mood and Affect: Mood is anxious.  Mood is not depressed. Affect is tearful. Behavior: Behavior normal.         Thought Content: Thought content normal.         Cognition and Memory: Cognition normal.         ASSESSMENT and PLAN  Diagnoses and all orders for this visit:    1. Stomach pain  -     REFERRAL TO GASTROENTEROLOGY  -     CT ABD W WO CONT; Future    2. Intractable vomiting with nausea, unspecified vomiting type    3. Anxiety  -     LORazepam (ATIVAN) 0.5 mg tablet; Take 0.5 Tabs by mouth every eight (8) hours as needed for Anxiety. Max Daily Amount: 0.75 mg.    4. Stress    5. Chest congestion  -     amoxicillin (AMOXIL) 500 mg capsule; Take 1 Cap by mouth two (2) times a day.     6. Diarrhea, unspecified type      Follow-up and Dispositions    · Return in about 6 months (around 7/27/2020), or if symptoms worsen or fail to improve.       lab results and schedule of future lab studies reviewed with patient  reviewed diet, exercise and weight control  reviewed medications and side effects in detail

## 2020-01-27 NOTE — PROGRESS NOTES
There are no preventive care reminders to display for this patient. Chief Complaint   Patient presents with    Abdominal Pain     Acute visit    Diarrhea    Vomiting    ED Follow-up     1/2020 [facility] for diarrhea, vomiting and abd pain    Weight Loss    Shoulder Pain     Left shoulder    Cough       1. Have you been to the ER, urgent care clinic since your last visit? Hospitalized since your last visit? Yes When: 1/2020 [facility] for diarrhea, vomiting and abd pain    2. Have you seen or consulted any other health care providers outside of the [address] [place] [address] since your last visit? Include any pap smears or colon screening. No    3) Do you have an Advance Directive on file? no    4) Are you interested in receiving information on Advance Directives? NO      Patient is accompanied by self I have received verbal consent from Michelle Cazares to discuss any/all medical information while they are present in the room.

## 2020-02-03 ENCOUNTER — HOSPITAL ENCOUNTER (OUTPATIENT)
Dept: CT IMAGING | Age: 35
Discharge: HOME | End: 2020-02-03
Attending: INTERNAL MEDICINE
Payer: MEDICAID

## 2020-02-03 DIAGNOSIS — R10.9 STOMACH PAIN: ICD-10-CM

## 2020-02-03 PROCEDURE — 74177 CT ABD & PELVIS W/CONTRAST: CPT

## 2020-02-03 PROCEDURE — 74011636320 HC RX REV CODE- 636/320: Performed by: RADIOLOGY

## 2020-02-03 PROCEDURE — 74011000258 HC RX REV CODE- 258: Performed by: RADIOLOGY

## 2020-02-03 RX ORDER — SODIUM CHLORIDE 0.9 % (FLUSH) 0.9 %
10 SYRINGE (ML) INJECTION
Status: COMPLETED | OUTPATIENT
Start: 2020-02-03 | End: 2020-02-03

## 2020-02-03 RX ADMIN — Medication 10 ML: at 19:01

## 2020-02-03 RX ADMIN — SODIUM CHLORIDE 100 ML: 900 INJECTION, SOLUTION INTRAVENOUS at 19:01

## 2020-02-03 RX ADMIN — IOPAMIDOL 100 ML: 755 INJECTION, SOLUTION INTRAVENOUS at 19:01

## 2020-02-03 RX ADMIN — IOHEXOL 50 ML: 240 INJECTION, SOLUTION INTRATHECAL; INTRAVASCULAR; INTRAVENOUS; ORAL at 19:22

## 2020-02-04 DIAGNOSIS — R10.9 ABDOMINAL PAIN, UNSPECIFIED ABDOMINAL LOCATION: Primary | ICD-10-CM

## 2020-02-04 DIAGNOSIS — R18.8 PELVIC FLUID COLLECTION: ICD-10-CM

## 2020-02-20 ENCOUNTER — HOSPITAL ENCOUNTER (OUTPATIENT)
Dept: ULTRASOUND IMAGING | Age: 35
Discharge: HOME | End: 2020-02-20
Attending: INTERNAL MEDICINE
Payer: MEDICAID

## 2020-02-20 DIAGNOSIS — R10.9 STOMACH ACHE: ICD-10-CM

## 2020-02-20 DIAGNOSIS — R18.8 ASCITIC FLUID: ICD-10-CM

## 2020-02-20 DIAGNOSIS — R18.8 PELVIC FLUID COLLECTION: ICD-10-CM

## 2020-02-20 DIAGNOSIS — R10.9 ABDOMINAL PAIN, UNSPECIFIED ABDOMINAL LOCATION: ICD-10-CM

## 2020-02-20 PROCEDURE — 76856 US EXAM PELVIC COMPLETE: CPT

## 2020-02-20 PROCEDURE — 76830 TRANSVAGINAL US NON-OB: CPT

## 2020-02-21 NOTE — PROGRESS NOTES
Spoke to patient using identifiers. Relayed results.  She is free from pain currently and no other additional symptoms

## 2020-04-21 ENCOUNTER — VIRTUAL VISIT (OUTPATIENT)
Dept: INTERNAL MEDICINE CLINIC | Age: 35
End: 2020-04-21

## 2020-04-21 VITALS — WEIGHT: 145 LBS | HEIGHT: 64 IN | BODY MASS INDEX: 24.75 KG/M2

## 2020-04-21 DIAGNOSIS — F41.9 ANXIETY: Primary | ICD-10-CM

## 2020-04-21 DIAGNOSIS — H02.843 SWELLING OF RIGHT EYELID: ICD-10-CM

## 2020-04-21 RX ORDER — GENTAMICIN SULFATE 3 MG/ML
2 SOLUTION/ DROPS OPHTHALMIC 3 TIMES DAILY
Qty: 1 BOTTLE | Refills: 0 | Status: SHIPPED | OUTPATIENT
Start: 2020-04-21 | End: 2020-06-09 | Stop reason: ALTCHOICE

## 2020-04-21 NOTE — PROGRESS NOTES
There are no preventive care reminders to display for this patient. Chief Complaint   Patient presents with    Eye Problem     right eyelid swollen no drainage,       1. Have you been to the ER, urgent care clinic since your last visit? Hospitalized since your last visit? No    2. Have you seen or consulted any other health care providers outside of the [address] [place] [address] since your last visit? Include any pap smears or colon screening. No    3) Do you have an Advance Directive on file? no    4) Are you interested in receiving information on Advance Directives? NO      Patient is accompanied by self I have received verbal consent from Mya Raphael to discuss any/all medical information while they are present in the room.

## 2020-04-21 NOTE — PROGRESS NOTES
Consent: Carmen Krause, who was seen by synchronous (real-time) audio-video technology, and/or her healthcare decision maker, is aware that this patient-initiated, Telehealth encounter on 4/21/2020 is a billable service, with coverage as determined by her insurance carrier. She is aware that she may receive a bill and has provided verbal consent to proceed: Yes. Assessment & Plan:   Diagnoses and all orders for this visit:    1. Anxiety    2. Swelling of right eyelid  -     gentamicin (GARAMYCIN) 0.3 % ophthalmic solution; Administer 2 Drops to right eye three (3) times daily. Administer 2 drops to right eye 3 times a day for 5 days            I spent at least 15 minutes with this established patient, and >50% of the time was spent counseling and/or coordinating care regarding eye swelling , anxiety   712  Subjective:   Carmen Krause is a 36 y.o. female who was seen for Eye Problem (right eyelid swollen no drainage,)    Saw patient via video for right eyelid swelling. Reports that it began 3 days ago and has not gotten any better. Reports that it feels like it has pressure behind the eye. Reports pain periodically. Denies loss of vision or headaches     Anxiety has gotten better. Has taken the ativan as needed     Denies fever, chills, CP, SOB    Prior to Admission medications    Medication Sig Start Date End Date Taking? Authorizing Provider   guaifenesin/dextromethorphan (ROBITUSSIN-DM PO) Take  by mouth. Provider, Historical   LORazepam (ATIVAN) 0.5 mg tablet Take 0.5 Tabs by mouth every eight (8) hours as needed for Anxiety. Max Daily Amount: 0.75 mg. 1/27/20   Alannah Lerma NP   amoxicillin (AMOXIL) 500 mg capsule Take 1 Cap by mouth two (2) times a day.  1/27/20   Alannah Lerma NP     No Known Allergies    Patient Active Problem List   Diagnosis Code    Thyromegaly E01.0    Thyroid nodule E04.1     Patient Active Problem List    Diagnosis Date Noted    Thyroid nodule 08/03/2017    Thyromegaly 08/29/2016     Current Outpatient Medications   Medication Sig Dispense Refill    gentamicin (GARAMYCIN) 0.3 % ophthalmic solution Administer 2 Drops to right eye three (3) times daily. Administer 2 drops to right eye 3 times a day for 5 days 1 Bottle 0    LORazepam (ATIVAN) 0.5 mg tablet Take 0.5 Tabs by mouth every eight (8) hours as needed for Anxiety. Max Daily Amount: 0.75 mg. 30 Tab 0     No Known Allergies  Past Medical History:   Diagnosis Date    Abnormal Pap smear     Follow-ups normal    Herpes simplex without mention of complication     HX OTHER MEDICAL     Endometriosus     Past Surgical History:   Procedure Laterality Date    HX OTHER SURGICAL  2005    Exploratory  laprascopic for endometriosus     Family History   Problem Relation Age of Onset    Cancer Maternal Grandmother         Breast    No Known Problems Mother     Arthritis-osteo Father     No Known Problems Sister     No Known Problems Brother     No Known Problems Sister      Social History     Tobacco Use    Smoking status: Never Smoker    Smokeless tobacco: Never Used   Substance Use Topics    Alcohol use: Yes     Alcohol/week: 0.0 standard drinks     Comment: Occasionally       Review of Systems   Constitutional: Negative for chills and fever. HENT: Negative. Eyes: Positive for pain and redness. Negative for blurred vision and double vision. Swelling of the eyelid    Respiratory: Negative. Cardiovascular: Negative. Neurological: Negative for dizziness and headaches.            Objective:   Vital Signs: (As obtained by patient/caregiver at home)  Visit Vitals  Ht 5' 4\" (1.626 m)   Wt 145 lb (65.8 kg)   LMP 04/21/2020   BMI 24.89 kg/m²        [INSTRUCTIONS:  \"[x]\" Indicates a positive item  \"[]\" Indicates a negative item  -- DELETE ALL ITEMS NOT EXAMINED]    Constitutional: [x] Appears well-developed and well-nourished [x] No apparent distress      [] Abnormal -     Mental status: [x] Alert and awake  [x] Oriented to person/place/time [x] Able to follow commands    [] Abnormal -     Eyes:   EOM    [x]  Normal    [] Abnormal -   Sclera  [x]  Normal    [] Abnormal -          Discharge [x]  None visible   [x] Abnormal - redness to the right iris, moderate swelling to the underside and eyelid of right eye    HENT: [x] Normocephalic, atraumatic  [] Abnormal -   [x] Mouth/Throat: Mucous membranes are moist    External Ears [x] Normal  [] Abnormal -    Neck: [x] No visualized mass [] Abnormal -     Pulmonary/Chest: [x] Respiratory effort normal   [x] No visualized signs of difficulty breathing or respiratory distress        [] Abnormal -        Neurological:        [x] No Facial Asymmetry (Cranial nerve 7 motor function) (limited exam due to video visit)          [x] No gaze palsy        [] Abnormal -          Skin:        [x] No significant exanthematous lesions or discoloration noted on facial skin         [] Abnormal -            Psychiatric:       [x] Normal Affect [] Abnormal -        [x] No Hallucinations    Other pertinent observable physical exam findings:-        We discussed the expected course, resolution and complications of the diagnosis(es) in detail. Medication risks, benefits, costs, interactions, and alternatives were discussed as indicated. I advised her to contact the office if her condition worsens, changes or fails to improve as anticipated. She expressed understanding with the diagnosis(es) and plan. Barbara Boyd is a 36 y.o. female being evaluated by a video visit encounter for concerns as above. A caregiver was present when appropriate. Due to this being a TeleHealth encounter (During [place] [address] public health emergency), evaluation of the following organ systems was limited: Vitals/Constitutional/EENT/Resp/CV/GI//MS/Neuro/Skin/Heme-Lymph-Imm.   Pursuant to the emergency declaration under the [facility] waiver authority and the Coronavirus Preparedness and Response Supplemental Appropriations Act, this Virtual  Visit was conducted, with patient's (and/or legal guardian's) consent, to reduce the patient's risk of exposure to COVID-19 and provide necessary medical care. Services were provided through a video synchronous discussion virtually to substitute for in-person clinic visit. Patient and provider were located at their individual homes.         Melva Strong NP

## 2020-04-29 ENCOUNTER — VIRTUAL VISIT (OUTPATIENT)
Dept: INTERNAL MEDICINE CLINIC | Age: 35
End: 2020-04-29

## 2020-04-29 DIAGNOSIS — R53.83 FATIGUE, UNSPECIFIED TYPE: ICD-10-CM

## 2020-04-29 DIAGNOSIS — F41.9 ANXIETY: ICD-10-CM

## 2020-04-29 DIAGNOSIS — H02.843 SWELLING OF RIGHT EYELID: Primary | ICD-10-CM

## 2020-04-29 DIAGNOSIS — D64.9 ANEMIA, UNSPECIFIED TYPE: ICD-10-CM

## 2020-04-29 NOTE — PROGRESS NOTES
VV    Identified pt with two pt identifiers(name and ). Reviewed record in preparation for visit and have obtained necessary documentation. All patient medications has been reviewed. Chief Complaint   Patient presents with    Swelling     pt states, no drainage, no eye pain, no blurred vision       There are no preventive care reminders to display for this patient. Vitals:    20 1005   PainSc:   0 - No pain   LMP: 2020       Wt Readings from Last 3 Encounters:   20 145 lb (65.8 kg)   20 142 lb 3.2 oz (64.5 kg)   10/31/19 152 lb (68.9 kg)     Temp Readings from Last 3 Encounters:   20 98.1 °F (36.7 °C) (Oral)   10/31/19 97.3 °F (36.3 °C) (Oral)   17 96.4 °F (35.8 °C) (Oral)     BP Readings from Last 3 Encounters:   20 118/70   10/31/19 92/62   17 92/61     Pulse Readings from Last 3 Encounters:   20 91   10/31/19 75   17 65       No results found for: HBA1C, HGBE8, EXC9BOFM, AJV9LWXG, LJZ2BDTH    Coordination of Care Questionnaire:   1) Have you been to an emergency room, urgent care, or hospitalized since your last visit?   no       2. Have seen or consulted any other health care provider since your last visit? NO    3) Do you have an Advanced Directive/ Living Will in place? NO  If yes, do we have a copy on file NO  If no, would you like information NO    Patient is accompanied by self I have received verbal consent from Cholo Jeffery to discuss any/all medical information while they are present in the room.

## 2020-04-29 NOTE — PROGRESS NOTES
Travel Questionnaire    1. Has pt traveled outside [facility] or [place] in the past 30 days? no   2. Has the pt been in contact with anyone suspected or confirmed dx of COVID-19 or flu? no   3.  Any sx? no

## 2020-06-09 ENCOUNTER — VIRTUAL VISIT (OUTPATIENT)
Dept: INTERNAL MEDICINE CLINIC | Age: 35
End: 2020-06-09

## 2020-06-09 DIAGNOSIS — H00.11 CHALAZION OF RIGHT UPPER EYELID: Primary | ICD-10-CM

## 2020-06-09 DIAGNOSIS — D64.9 ANEMIA, UNSPECIFIED TYPE: ICD-10-CM

## 2020-06-09 DIAGNOSIS — F41.9 ANXIETY: ICD-10-CM

## 2020-06-09 RX ORDER — PREDNISOLONE ACETATE 10 MG/ML
1 SUSPENSION/ DROPS OPHTHALMIC 4 TIMES DAILY
Qty: 15 ML | Refills: 0 | Status: SHIPPED | OUTPATIENT
Start: 2020-06-09 | End: 2020-10-23 | Stop reason: ALTCHOICE

## 2020-06-09 NOTE — PROGRESS NOTES
Chief Complaint   Patient presents with    Stye     right eye, this is third visit for this ravi         1. Have you been to the ER, urgent care clinic since your last visit? Hospitalized since your last visit? no    2. Have you seen or consulted any other health care providers outside of the [address] [place] [address] since your last visit? Include any pap smears or colon screening. Yes eye doctor, kylee no better.

## 2020-06-09 NOTE — PROGRESS NOTES
Jason White is a 36 y.o. female who was seen by synchronous (real-time) audio-video technology on 6/9/2020. Consent: Jason White, who was seen by synchronous (real-time) audio-video technology, and/or her healthcare decision maker, is aware that this patient-initiated, Telehealth encounter on 6/9/2020 is a billable service, with coverage as determined by her insurance carrier. She is aware that she may receive a bill and has provided verbal consent to proceed: Yes. Assessment & Plan:   Diagnoses and all orders for this visit:    1. Chalazion of right upper eyelid  -     prednisoLONE acetate (PRED FORTE) 1 % ophthalmic suspension; Administer 1 Drop to right eye four (4) times daily. 2. Anxiety    3. Anemia, unspecified type        Subjective:   Jason White is a 36 y.o. female who was seen for Stye (right eye, this is third visit for this ravi)  Saw patient via video for right eyelid swelling. Reports that it began over a month ago . Reports taking the antibiotics drops, but does not see any improvements. Was seen by optometry a few weeks back and was given another antibiotic. Denies any loss of vision or photosensitivity.     Reports no pain. Denies loss of vision or headaches      Anxiety has gotten better. Has taken the ativan as needed      Denies fever, chills, CP, SOB    Spoke to Silvio Song. Will try steroid drops and then send to [facility] surgeon if not better. Patient agrees     Prior to Admission medications    Medication Sig Start Date End Date Taking? Authorizing Provider   LORazepam (ATIVAN) 0.5 mg tablet Take 0.5 Tabs by mouth every eight (8) hours as needed for Anxiety. Max Daily Amount: 0.75 mg. 1/27/20  Yes Edu Lerma NP   gentamicin (GARAMYCIN) 0.3 % ophthalmic solution Administer 2 Drops to right eye three (3) times daily.  Administer 2 drops to right eye 3 times a day for 5 days 4/21/20   Marino Moreira NP     No Known Allergies    Patient Active Problem List Diagnosis Code    Thyromegaly E01.0    Thyroid nodule E04.1     Patient Active Problem List    Diagnosis Date Noted    Thyroid nodule 08/03/2017    Thyromegaly 08/29/2016     Current Outpatient Medications   Medication Sig Dispense Refill    prednisoLONE acetate (PRED FORTE) 1 % ophthalmic suspension Administer 1 Drop to right eye four (4) times daily. 15 mL 0    LORazepam (ATIVAN) 0.5 mg tablet Take 0.5 Tabs by mouth every eight (8) hours as needed for Anxiety. Max Daily Amount: 0.75 mg. 30 Tab 0     No Known Allergies  Past Medical History:   Diagnosis Date    Abnormal Pap smear     Follow-ups normal    Herpes simplex without mention of complication     HX OTHER MEDICAL     Endometriosus     Past Surgical History:   Procedure Laterality Date    HX OTHER SURGICAL  2005    Exploratory  laprascopic for endometriosus     Family History   Problem Relation Age of Onset    Cancer Maternal Grandmother         Breast    No Known Problems Mother     Arthritis-osteo Father     No Known Problems Sister     No Known Problems Brother     No Known Problems Sister      Social History     Tobacco Use    Smoking status: Never Smoker    Smokeless tobacco: Never Used   Substance Use Topics    Alcohol use: Yes     Alcohol/week: 0.0 standard drinks     Comment: Occasionally       Review of Systems   Constitutional: Negative for chills and fever. Eyes: Negative for blurred vision, double vision, photophobia, pain, discharge and redness. Right eye with puffiness    Respiratory: Negative. Cardiovascular: Negative. Musculoskeletal: Negative. Skin: Negative for rash. Neurological: Negative for dizziness, seizures, weakness and headaches. Psychiatric/Behavioral: The patient is nervous/anxious. Objective:   Vital Signs: (As obtained by patient/caregiver at home)  There were no vitals taken for this visit.      [INSTRUCTIONS:  \"[x]\" Indicates a positive item  \"[]\" Indicates a negative item  -- DELETE ALL ITEMS NOT EXAMINED]    Constitutional: [x] Appears well-developed and well-nourished [x] No apparent distress      [] Abnormal -     Mental status: [x] Alert and awake  [x] Oriented to person/place/time [x] Able to follow commands    [] Abnormal -     Eyes:   EOM    [x]  Normal    [] Abnormal -   Sclera  [x]  Normal    [] Abnormal -          Discharge [x]  None visible   [x] Abnormal -  Minimal swelling to right upper eyelid     HENT: [x] Normocephalic, atraumatic  [] Abnormal -   [x] Mouth/Throat: Mucous membranes are moist    External Ears [x] Normal  [] Abnormal -    Neck: [x] No visualized mass [] Abnormal -     Pulmonary/Chest: [x] Respiratory effort normal   [x] No visualized signs of difficulty breathing or respiratory distress        [] Abnormal -      Musculoskeletal:   [x] Normal gait with no signs of ataxia         [x] Normal range of motion of neck        [] Abnormal -     Neurological:        [x] No Facial Asymmetry (Cranial nerve 7 motor function) (limited exam due to video visit)          [x] No gaze palsy        [] Abnormal -          Skin:        [x] No significant exanthematous lesions or discoloration noted on facial skin         [] Abnormal -            Psychiatric:       [x] Normal Affect [] Abnormal -        [x] No Hallucinations    Other pertinent observable physical exam findings:-        We discussed the expected course, resolution and complications of the diagnosis(es) in detail. Medication risks, benefits, costs, interactions, and alternatives were discussed as indicated. I advised her to contact the office if her condition worsens, changes or fails to improve as anticipated. She expressed understanding with the diagnosis(es) and plan. Duane An is a 36 y.o. female who was evaluated by a video visit encounter for concerns as above. Patient identification was verified prior to start of the visit. A caregiver was present when appropriate.  Due to this being a TeleHealth encounter (During [place]-25 public health emergency), evaluation of the following organ systems was limited: Vitals/Constitutional/EENT/Resp/CV/GI//MS/Neuro/Skin/Heme-Lymph-Imm. Pursuant to the emergency declaration under the [address] [place] [address], [facility] waiver authority and the [employer] Act, this Virtual  Visit was conducted, with patient's (and/or legal guardian's) consent, to reduce the patient's risk of exposure to COVID-19 and provide necessary medical care. Services were provided through a video synchronous discussion virtually to substitute for in-person clinic visit. Patient and provider were located at their individual homes.       Alee Huber NP

## 2020-06-30 ENCOUNTER — TELEPHONE (OUTPATIENT)
Dept: INTERNAL MEDICINE CLINIC | Age: 35
End: 2020-06-30

## 2020-06-30 DIAGNOSIS — Z11.1 SCREENING-PULMONARY TB: Primary | ICD-10-CM

## 2020-06-30 NOTE — TELEPHONE ENCOUNTER
Pt called and asked advised that she needs PPD test for employer and wants to know if [facility] can order.

## 2020-07-01 NOTE — TELEPHONE ENCOUNTER
Per verbal order from provider quantiferon gold ordered, patient aware and states she will  at office

## 2020-10-20 ENCOUNTER — HOSPITAL ENCOUNTER (EMERGENCY)
Age: 35
Discharge: HOME | End: 2020-10-20
Attending: STUDENT IN AN ORGANIZED HEALTH CARE EDUCATION/TRAINING PROGRAM
Payer: MEDICAID

## 2020-10-20 ENCOUNTER — APPOINTMENT (OUTPATIENT)
Dept: CT IMAGING | Age: 35
End: 2020-10-20
Attending: STUDENT IN AN ORGANIZED HEALTH CARE EDUCATION/TRAINING PROGRAM
Payer: MEDICAID

## 2020-10-20 VITALS
DIASTOLIC BLOOD PRESSURE: 69 MMHG | SYSTOLIC BLOOD PRESSURE: 112 MMHG | OXYGEN SATURATION: 100 % | BODY MASS INDEX: 22.2 KG/M2 | WEIGHT: 130 LBS | TEMPERATURE: 97.7 F | HEART RATE: 54 BPM | HEIGHT: 64 IN | RESPIRATION RATE: 16 BRPM

## 2020-10-20 DIAGNOSIS — R11.2 NON-INTRACTABLE VOMITING WITH NAUSEA, UNSPECIFIED VOMITING TYPE: Primary | ICD-10-CM

## 2020-10-20 LAB
ALBUMIN SERPL-MCNC: 3.7 G/DL (ref 3.5–5)
ALBUMIN/GLOB SERPL: 1 {RATIO} (ref 1.1–2.2)
ALP SERPL-CCNC: 49 U/L (ref 45–117)
ALT SERPL-CCNC: 21 U/L (ref 12–78)
ANION GAP SERPL CALC-SCNC: 6 MMOL/L (ref 5–15)
AST SERPL-CCNC: 16 U/L (ref 15–37)
ATRIAL RATE: 50 BPM
BILIRUB SERPL-MCNC: 0.5 MG/DL (ref 0.2–1)
BUN SERPL-MCNC: 14 MG/DL (ref 6–20)
BUN/CREAT SERPL: 16 (ref 12–20)
CALCIUM SERPL-MCNC: 8.8 MG/DL (ref 8.5–10.1)
CALCULATED P AXIS, ECG09: 46 DEGREES
CALCULATED R AXIS, ECG10: 52 DEGREES
CALCULATED T AXIS, ECG11: 38 DEGREES
CHLORIDE SERPL-SCNC: 110 MMOL/L (ref 97–108)
CO2 SERPL-SCNC: 23 MMOL/L (ref 21–32)
CREAT SERPL-MCNC: 0.9 MG/DL (ref 0.55–1.02)
DIAGNOSIS, 93000: NORMAL
ERYTHROCYTE [DISTWIDTH] IN BLOOD BY AUTOMATED COUNT: 13 % (ref 11.5–14.5)
GLOBULIN SER CALC-MCNC: 3.8 G/DL (ref 2–4)
GLUCOSE SERPL-MCNC: 159 MG/DL (ref 65–100)
HCG SERPL QL: NEGATIVE
HCT VFR BLD AUTO: 34.6 % (ref 35–47)
HGB BLD-MCNC: 11.3 G/DL (ref 11.5–16)
LIPASE SERPL-CCNC: 50 U/L (ref 73–393)
MCH RBC QN AUTO: 30.5 PG (ref 26–34)
MCHC RBC AUTO-ENTMCNC: 32.7 G/DL (ref 30–36.5)
MCV RBC AUTO: 93.5 FL (ref 80–99)
NRBC # BLD: 0 K/UL (ref 0–0.01)
NRBC BLD-RTO: 0 PER 100 WBC
P-R INTERVAL, ECG05: 200 MS
PLATELET # BLD AUTO: 156 K/UL (ref 150–400)
PMV BLD AUTO: 10.2 FL (ref 8.9–12.9)
POTASSIUM SERPL-SCNC: 4 MMOL/L (ref 3.5–5.1)
PROT SERPL-MCNC: 7.5 G/DL (ref 6.4–8.2)
Q-T INTERVAL, ECG07: 454 MS
QRS DURATION, ECG06: 82 MS
QTC CALCULATION (BEZET), ECG08: 413 MS
RBC # BLD AUTO: 3.7 M/UL (ref 3.8–5.2)
SODIUM SERPL-SCNC: 139 MMOL/L (ref 136–145)
VENTRICULAR RATE, ECG03: 50 BPM
WBC # BLD AUTO: 5.6 K/UL (ref 3.6–11)

## 2020-10-20 PROCEDURE — 74011250636 HC RX REV CODE- 250/636: Performed by: STUDENT IN AN ORGANIZED HEALTH CARE EDUCATION/TRAINING PROGRAM

## 2020-10-20 PROCEDURE — 84703 CHORIONIC GONADOTROPIN ASSAY: CPT

## 2020-10-20 PROCEDURE — 74011000636 HC RX REV CODE- 636: Performed by: STUDENT IN AN ORGANIZED HEALTH CARE EDUCATION/TRAINING PROGRAM

## 2020-10-20 PROCEDURE — 96374 THER/PROPH/DIAG INJ IV PUSH: CPT

## 2020-10-20 PROCEDURE — 99284 EMERGENCY DEPT VISIT MOD MDM: CPT

## 2020-10-20 PROCEDURE — 93005 ELECTROCARDIOGRAM TRACING: CPT

## 2020-10-20 PROCEDURE — 96375 TX/PRO/DX INJ NEW DRUG ADDON: CPT

## 2020-10-20 PROCEDURE — 36415 COLL VENOUS BLD VENIPUNCTURE: CPT

## 2020-10-20 PROCEDURE — 80053 COMPREHEN METABOLIC PANEL: CPT

## 2020-10-20 PROCEDURE — 83690 ASSAY OF LIPASE: CPT

## 2020-10-20 PROCEDURE — 85027 COMPLETE CBC AUTOMATED: CPT

## 2020-10-20 PROCEDURE — 74177 CT ABD & PELVIS W/CONTRAST: CPT

## 2020-10-20 RX ORDER — FAMOTIDINE 20 MG/1
20 TABLET, FILM COATED ORAL 2 TIMES DAILY
Qty: 20 TAB | Refills: 0 | Status: SHIPPED | OUTPATIENT
Start: 2020-10-20 | End: 2020-10-30

## 2020-10-20 RX ORDER — DIPHENHYDRAMINE HYDROCHLORIDE 50 MG/ML
25 INJECTION, SOLUTION INTRAMUSCULAR; INTRAVENOUS
Status: COMPLETED | OUTPATIENT
Start: 2020-10-20 | End: 2020-10-20

## 2020-10-20 RX ORDER — METOCLOPRAMIDE HYDROCHLORIDE 5 MG/ML
10 INJECTION INTRAMUSCULAR; INTRAVENOUS
Status: COMPLETED | OUTPATIENT
Start: 2020-10-20 | End: 2020-10-20

## 2020-10-20 RX ORDER — ONDANSETRON 2 MG/ML
4 INJECTION INTRAMUSCULAR; INTRAVENOUS
Status: COMPLETED | OUTPATIENT
Start: 2020-10-20 | End: 2020-10-20

## 2020-10-20 RX ORDER — MORPHINE SULFATE 2 MG/ML
4 INJECTION, SOLUTION INTRAMUSCULAR; INTRAVENOUS ONCE
Status: COMPLETED | OUTPATIENT
Start: 2020-10-20 | End: 2020-10-20

## 2020-10-20 RX ORDER — ONDANSETRON 4 MG/1
4 TABLET, ORALLY DISINTEGRATING ORAL
Qty: 6 TAB | Refills: 0 | Status: ON HOLD | OUTPATIENT
Start: 2020-10-20 | End: 2020-12-21

## 2020-10-20 RX ORDER — SODIUM CHLORIDE 0.9 % (FLUSH) 0.9 %
10 SYRINGE (ML) INJECTION
Status: COMPLETED | OUTPATIENT
Start: 2020-10-20 | End: 2020-10-20

## 2020-10-20 RX ADMIN — ONDANSETRON 4 MG: 2 INJECTION INTRAMUSCULAR; INTRAVENOUS at 12:41

## 2020-10-20 RX ADMIN — MORPHINE SULFATE 4 MG: 2 INJECTION, SOLUTION INTRAMUSCULAR; INTRAVENOUS at 14:35

## 2020-10-20 RX ADMIN — Medication 10 ML: at 14:01

## 2020-10-20 RX ADMIN — METOCLOPRAMIDE 10 MG: 5 INJECTION, SOLUTION INTRAMUSCULAR; INTRAVENOUS at 13:32

## 2020-10-20 RX ADMIN — IOPAMIDOL 100 ML: 755 INJECTION, SOLUTION INTRAVENOUS at 14:01

## 2020-10-20 RX ADMIN — DIPHENHYDRAMINE HYDROCHLORIDE 25 MG: 50 INJECTION, SOLUTION INTRAMUSCULAR; INTRAVENOUS at 13:32

## 2020-10-20 RX ADMIN — SODIUM CHLORIDE 1000 ML: 900 INJECTION, SOLUTION INTRAVENOUS at 12:41

## 2020-10-21 NOTE — ED PROVIDER NOTES
EMERGENCY DEPARTMENT HISTORY AND PHYSICAL EXAM      Date: 10/20/2020  Patient Name: Bo Boast    History of Presenting Illness     Chief Complaint   Patient presents with    Abdominal Pain     since waking with N/V  Sharp pain all over stomach. No GI hx.  Seen for same with no dx in Jan.  Told to follow up with GI, however has not.  Diarrhea    Vomiting    Fatigue         HPI: Bo Boast, 30 y.o. female presents to the ED with cc of nausea and vomiting. This has been going over the past day. She states that she has had episodes like this in the past.  She reports diffuse sharp abdominal pain and countless episodes of nonbloody emesis. She also reports nonbloody diarrhea. No fevers, no dysuria or hematuria, no vaginal bleeding, no vaginal discharge, no new sexual partners or any concern for STDs. Denies any past medical history, does not take any medications, no prior surgeries on her abdomen. There are no other complaints, changes, or physical findings at this time. PCP: Jonah Magana MD    No current facility-administered medications on file prior to encounter. Current Outpatient Medications on File Prior to Encounter   Medication Sig Dispense Refill    prednisoLONE acetate (PRED FORTE) 1 % ophthalmic suspension Administer 1 Drop to right eye four (4) times daily. 15 mL 0    LORazepam (ATIVAN) 0.5 mg tablet Take 0.5 Tabs by mouth every eight (8) hours as needed for Anxiety.  Max Daily Amount: 0.75 mg. 30 Tab 0       Past History     Past Medical History:  Past Medical History:   Diagnosis Date    Abnormal Pap smear     Follow-ups normal    Herpes simplex without mention of complication     HX OTHER MEDICAL     Endometriosus       Past Surgical History:  Past Surgical History:   Procedure Laterality Date    HX OTHER SURGICAL  2005    Exploratory  laprascopic for endometriosus       Family History:  Family History   Problem Relation Age of Onset    Cancer Maternal Grandmother         Breast    No Known Problems Mother     Arthritis-osteo Father     No Known Problems Sister     No Known Problems Brother     No Known Problems Sister        Social History:  Social History     Tobacco Use    Smoking status: Never Smoker    Smokeless tobacco: Never Used   Substance Use Topics    Alcohol use: Yes     Alcohol/week: 0.0 standard drinks     Comment: Occasionally    Drug use: No       Allergies:  No Known Allergies      Review of Systems   no fever  No eye pain  No ear pain  no shortness of breath  no chest pain  Reports abdominal pain  no dysuria  no leg pain  No rash  No lymphadenopathy  No weight loss    Physical Exam   Physical Exam  Constitutional:       Appearance: She is well-developed. HENT:      Head: Normocephalic and atraumatic. Eyes:      Extraocular Movements: Extraocular movements intact. Cardiovascular:      Rate and Rhythm: Normal rate and regular rhythm. Pulmonary:      Effort: Pulmonary effort is normal.      Breath sounds: Normal breath sounds. Abdominal:      Comments: Slight diffuse tenderness to palpation without any localizable tenderness, no guarding or rebound   Skin:     General: Skin is warm and dry. Neurological:      General: No focal deficit present. Mental Status: She is alert and oriented to person, place, and time. Psychiatric:         Mood and Affect: Mood normal.         Diagnostic Study Results     Labs -   No results found for this or any previous visit (from the past 24 hour(s)). Radiologic Studies -   CT ABD PELV W CONT   Final Result   IMPRESSION: Small abdominal free fluid of uncertain etiology. No acute findings   otherwise. CT Results  (Last 48 hours)               10/20/20 1401  CT ABD PELV W CONT Final result    Impression:  IMPRESSION: Small abdominal free fluid of uncertain etiology. No acute findings   otherwise. Narrative:  EXAM: CT ABD PELV W CONT       INDICATION: Abdominal pain. COMPARISON: CT 2/3/2020. CONTRAST: 100 mL of Isovue-370. TECHNIQUE:    Following the uneventful intravenous administration of contrast, thin axial   images were obtained through the abdomen and pelvis. Coronal and sagittal   reconstructions were generated. Oral contrast administered. CT dose reduction   was achieved through use of a standardized protocol tailored for this   examination and automatic exposure control for dose modulation. FINDINGS:    LOWER THORAX: No significant abnormality in the incidentally imaged lower chest.   LIVER: No mass. BILIARY TREE: Gallbladder is within normal limits. CBD is not dilated. SPLEEN: within normal limits. PANCREAS: No mass or ductal dilatation. ADRENALS: Unremarkable. KIDNEYS: No mass, calculus, or hydronephrosis. STOMACH: Unremarkable. SMALL BOWEL: No dilatation or wall thickening. COLON: No dilatation or wall thickening. APPENDIX: Unremarkable. PERITONEUM: Small free fluid. No pneumoperitoneum. Tonye Cogan RETROPERITONEUM: No lymphadenopathy or aortic aneurysm. REPRODUCTIVE ORGANS: Uterus and ovaries appear unremarkable. URINARY BLADDER: No mass or calculus. BONES: No destructive bone lesion. ABDOMINAL WALL: No mass or hernia. ADDITIONAL COMMENTS: N/A               CXR Results  (Last 48 hours)    None            Medical Decision Making   I am the first provider for this patient. I reviewed the vital signs, available nursing notes, past medical history, past surgical history, family history and social history. Vital Signs-Reviewed the patient's vital signs. Patient Vitals for the past 24 hrs:   Temp Pulse BP SpO2   10/20/20 1500 -- (!) 54 112/69 100 %   10/20/20 1437 97.7 °F (36.5 °C) 60 119/70 99 %         Provider Notes (Medical Decision Making):   80-year-old female presenting with nausea and vomiting. Her symptoms are concerning for gastroenteritis, gastritis, cyclical vomiting, IBS.   She has some slight diffuse tenderness to palpation, less likely but differential includes appendicitis, atypical diverticulitis, pancreatitis. Laboratory work will be obtained, she is given IV fluids and antiemetics as well as pain medications. ED Course:     Initial assessment performed. The patients presenting problems have been discussed, and they are in agreement with the care plan formulated and outlined with them. I have encouraged them to ask questions as they arise throughout their visit. CBC shows slight anemia, negative for leukocytosis. Basic metabolic panel shows good renal function. Pregnancy test is negative. On reevaluation, she states that she feels unchanged, continues to have abdominal pain and nausea. Second run of antiemetic is given and CT abdomen pelvis will be obtained given the lack of change in her symptoms. This is unremarkable for any acute process other than small amount of intra-abdominal fluid. On reevaluation, she is resting comfortably and states she feels much improved, patient is counseled on supportive care and return precautions. Will return to the ED for any worsening pain, inability tolerate p.o., fevers. Will followup with primary care doctor, gastroenterology within 5 days. .    Critical Care Time:         Disposition:  Home    PLAN:  1. Discharge Medication List as of 10/20/2020  3:17 PM      START taking these medications    Details   ondansetron (Zofran ODT) 4 mg disintegrating tablet Take 1 Tab by mouth every eight (8) hours as needed for Nausea., Normal, Disp-6 Tab,R-0      famotidine (Pepcid) 20 mg tablet Take 1 Tab by mouth two (2) times a day for 10 days. , Normal, Disp-20 Tab,R-0         CONTINUE these medications which have NOT CHANGED    Details   prednisoLONE acetate (PRED FORTE) 1 % ophthalmic suspension Administer 1 Drop to right eye four (4) times daily. , Normal, Disp-15 mL, R-0      LORazepam (ATIVAN) 0.5 mg tablet Take 0.5 Tabs by mouth every eight (8) hours as needed for Anxiety. Max Daily Amount: 0.75 mg., Normal, Disp-30 Tab, R-0           2.    Follow-up Information     Follow up With Specialties Details Why Contact Info    Nidia Martinez MD Internal Medicine Schedule an appointment as soon as possible for a visit in 3 days  [address]  [address] [facility] [address]      [address] [facility]  Call in 2 days  [address]    [address] DEPT Emergency Medicine  As needed, If symptoms worsen [address]  [address] [facility]  [phone]        Return to ED if worse     Diagnosis     Clinical Impression: Acute nausea and vomiting

## 2020-10-23 ENCOUNTER — VIRTUAL VISIT (OUTPATIENT)
Dept: INTERNAL MEDICINE CLINIC | Age: 35
End: 2020-10-23
Payer: MEDICAID

## 2020-10-23 DIAGNOSIS — F41.9 ANXIETY: ICD-10-CM

## 2020-10-23 DIAGNOSIS — R19.7 DIARRHEA, UNSPECIFIED TYPE: ICD-10-CM

## 2020-10-23 DIAGNOSIS — R10.9 ABDOMINAL PAIN, UNSPECIFIED ABDOMINAL LOCATION: ICD-10-CM

## 2020-10-23 DIAGNOSIS — R73.09 HIGH GLUCOSE: ICD-10-CM

## 2020-10-23 DIAGNOSIS — R11.2 NAUSEA AND VOMITING, INTRACTABILITY OF VOMITING NOT SPECIFIED, UNSPECIFIED VOMITING TYPE: Primary | ICD-10-CM

## 2020-10-23 DIAGNOSIS — D64.9 ANEMIA, UNSPECIFIED TYPE: ICD-10-CM

## 2020-10-23 DIAGNOSIS — F41.0 PANIC ATTACK: ICD-10-CM

## 2020-10-23 PROCEDURE — 99443 PR PHYS/QHP TELEPHONE EVALUATION 21-30 MIN: CPT | Performed by: NURSE PRACTITIONER

## 2020-10-23 RX ORDER — ESCITALOPRAM OXALATE 5 MG/1
5 TABLET ORAL DAILY
Qty: 30 TAB | Refills: 0 | Status: SHIPPED | OUTPATIENT
Start: 2020-10-23 | End: 2020-11-18 | Stop reason: SDUPTHER

## 2020-10-23 RX ORDER — PROMETHAZINE HYDROCHLORIDE 12.5 MG/1
12.5 TABLET ORAL
Qty: 20 TAB | Refills: 0 | Status: ON HOLD | OUTPATIENT
Start: 2020-10-23 | End: 2020-12-21

## 2020-10-23 NOTE — PROGRESS NOTES
Kristal Lawrenec is a 30 y.o. female, evaluated via audio-only technology on 10/23/2020 for Hospital Follow Up [facility]  10/20 vomiting,abdominal pain ); Abdominal Pain (started in Jan ); Vomiting (passed out from vomiting and went to ER); and Panic Attack (2 last week )  . Assessment & Plan:   Diagnoses and all orders for this visit:    1. Nausea and vomiting, intractability of vomiting not specified, unspecified vomiting type  -     Recommend taking Pepcid 20 mg po bid, as prescribed by ER provider. Zofran previously ineffective. Will order  promethazine (PHENERGAN) 12.5 mg tablet; Take 1 Tab by mouth every six (6) hours as needed for Nausea. Discussed medication and possible side effects in detail. Do not take in combination with other medications/substances that can cause drowsiness. Will order  -     REFERRAL TO GASTROENTEROLOGY. Provided contact information for alternate gastroenterology group to see if sooner appointment available. Recommend sips of fluids, as tolerated to remain hydrated. Recommend resuming bland diet as tolerated. 2. Diarrhea, unspecified type  As above,  -     REFERRAL TO GASTROENTEROLOGY    3. Abdominal pain, unspecified abdominal location  As above,  -     REFERRAL TO GASTROENTEROLOGY  If experiencing severe abdominal pain, blood in stool or other emergency, present to ER. 4. Anxiety  Will order  -     escitalopram oxalate (LEXAPRO) 5 mg tablet; Take 1 Tab by mouth daily. 5. Panic attack  As above,  -     escitalopram oxalate (LEXAPRO) 5 mg tablet; Take 1 Tab by mouth daily. 6. Anemia, unspecified type  Will order  -     CBC WITH AUTOMATED DIFF  -     IRON PROFILE  -     FERRITIN    7. High glucose  Will order  -     METABOLIC PANEL, COMPREHENSIVE  -     HEMOGLOBIN A1C WITH EAG    Patient encouraged to call or return to office if symptoms do not improve or worsen. Discussed medications and possible side effects in detail.   Reviewed plan of care with patient who acknowledges understanding and agrees. Follow-up with Dr. Angie Chatman in one month, or sooner as needed. 12  Subjective: This is a patient of Dr. Angie Chatman who presents today with complaints of nausea and vomiting as well as anxiety. The patient was seen at [facility] ED on 10/20/20 related to diffuse abdominal pain, nausea, vomiting, and diarrhea. CT of abdomen was completed which indicated small abdominal free fluid without other acute findings. The patient states she presented to the ER after her mother found her passed out in the bathroom from vomiting. She states since December 2019 she has had 13 episodes with similar symptoms. She says she has passed out in December 2019 and during the current occurrence due to vomiting. She is concerned that she has been losing weight during this time period. She states that when symptoms occur she vomits many times (10-20) per day and also experiences loose stools. She is unable to tolerate food or fluids. She has not noted any blood in stool or emesis. She did not eat or drink yesterday or today and is feeling better. She is worried that if she eats symptoms will return. She took Zofran and Pepcid prescribed by ER when symptoms were still occurring two days ago and they did not seem to help. Patient was previously referred to gastroenterology, but states appt was canceled due to Covid. She called again today to reschedule and was advised no appointment was available until December. Patient also has concerns about her anxiety and panic attacks. She has used Lorazepam sparingly in the past for panic attacks, which is effective, but she has been experiencing this more frequently recently. 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 She experiencing hyperventilation and chest tightness with the panic attacks. She states she has had to take Lorazepam a couple of times a week over the last 3 weeks related to panic attacks.   She wonders if there is something else to take to help. Of note, Hgb in ER- 11.3. Patient states she does experience regular, heavy periods. Pelvic ultrasound in February 2020 showed endometrial canal and pelvic free fluid, likely physiologic in nature. Of note, glucose in ER-159. Prior to Admission medications    Medication Sig Start Date End Date Taking? Authorizing Provider   ondansetron (Zofran ODT) 4 mg disintegrating tablet Take 1 Tab by mouth every eight (8) hours as needed for Nausea. 10/20/20  Yes Josephine Ly MD   famotidine (Pepcid) 20 mg tablet Take 1 Tab by mouth two (2) times a day for 10 days. 10/20/20 10/30/20 Yes Josephine Ly MD   LORazepam (ATIVAN) 0.5 mg tablet Take 0.5 Tabs by mouth every eight (8) hours as needed for Anxiety. Max Daily Amount: 0.75 mg. 1/27/20  Yes Neo Lerma NP   prednisoLONE acetate (PRED FORTE) 1 % ophthalmic suspension Administer 1 Drop to right eye four (4) times daily. 6/9/20 10/23/20  Yousif Ordonez NP     No Known Allergies  Past Medical History:   Diagnosis Date    Abnormal Pap smear     Follow-ups normal    Herpes simplex without mention of complication     HX OTHER MEDICAL     Endometriosus     Past Surgical History:   Procedure Laterality Date    HX OTHER SURGICAL  2005    Exploratory  laprascopic for endometriosus       Review of Systems   Constitutional: Negative for chills and fever. HENT: Negative. Respiratory: Negative. Cardiovascular: Negative. Gastrointestinal: Positive for abdominal pain, diarrhea, nausea and vomiting. Negative for blood in stool. Genitourinary: Negative. Skin: Negative. Neurological: Negative. Psychiatric/Behavioral: The patient is nervous/anxious.           Suman Thornton, who was evaluated through a patient-initiated, synchronous (real-time) audio only encounter, and/or her healthcare decision maker, is aware that it is a billable service, with coverage as determined by her insurance carrier. She provided verbal consent to proceed: Yes. She has not had a related appointment within my department in the past 7 days or scheduled within the next 24 hours.       Total Time: minutes: 21-30 minutes    Nisha Engle NP

## 2020-11-10 LAB
ALBUMIN SERPL-MCNC: 4.2 G/DL (ref 3.8–4.8)
ALBUMIN/GLOB SERPL: 1.8 {RATIO} (ref 1.2–2.2)
ALP SERPL-CCNC: 44 IU/L (ref 39–117)
ALT SERPL-CCNC: 11 IU/L (ref 0–32)
AST SERPL-CCNC: 18 IU/L (ref 0–40)
BASOPHILS # BLD AUTO: 0 X10E3/UL (ref 0–0.2)
BASOPHILS NFR BLD AUTO: 1 %
BILIRUB SERPL-MCNC: 0.2 MG/DL (ref 0–1.2)
BUN SERPL-MCNC: 11 MG/DL (ref 6–20)
BUN/CREAT SERPL: 14 (ref 9–23)
CALCIUM SERPL-MCNC: 9 MG/DL (ref 8.7–10.2)
CHLORIDE SERPL-SCNC: 104 MMOL/L (ref 96–106)
CO2 SERPL-SCNC: 24 MMOL/L (ref 20–29)
CREAT SERPL-MCNC: 0.77 MG/DL (ref 0.57–1)
EOSINOPHIL # BLD AUTO: 0 X10E3/UL (ref 0–0.4)
EOSINOPHIL NFR BLD AUTO: 1 %
ERYTHROCYTE [DISTWIDTH] IN BLOOD BY AUTOMATED COUNT: 12.4 % (ref 11.7–15.4)
EST. AVERAGE GLUCOSE BLD GHB EST-MCNC: 114 MG/DL
FERRITIN SERPL-MCNC: 19 NG/ML (ref 15–150)
GLOBULIN SER CALC-MCNC: 2.4 G/DL (ref 1.5–4.5)
GLUCOSE SERPL-MCNC: 100 MG/DL (ref 65–99)
HBA1C MFR BLD: 5.6 % (ref 4.8–5.6)
HCT VFR BLD AUTO: 34 % (ref 34–46.6)
HGB BLD-MCNC: 11.3 G/DL (ref 11.1–15.9)
IMM GRANULOCYTES # BLD AUTO: 0 X10E3/UL (ref 0–0.1)
IMM GRANULOCYTES NFR BLD AUTO: 0 %
IRON SATN MFR SERPL: 31 % (ref 15–55)
IRON SERPL-MCNC: 93 UG/DL (ref 27–159)
LYMPHOCYTES # BLD AUTO: 1.7 X10E3/UL (ref 0.7–3.1)
LYMPHOCYTES NFR BLD AUTO: 51 %
MCH RBC QN AUTO: 31.7 PG (ref 26.6–33)
MCHC RBC AUTO-ENTMCNC: 33.2 G/DL (ref 31.5–35.7)
MCV RBC AUTO: 96 FL (ref 79–97)
MONOCYTES # BLD AUTO: 0.3 X10E3/UL (ref 0.1–0.9)
MONOCYTES NFR BLD AUTO: 10 %
NEUTROPHILS # BLD AUTO: 1.2 X10E3/UL (ref 1.4–7)
NEUTROPHILS NFR BLD AUTO: 37 %
PLATELET # BLD AUTO: 190 X10E3/UL (ref 150–450)
POTASSIUM SERPL-SCNC: 4.3 MMOL/L (ref 3.5–5.2)
PROT SERPL-MCNC: 6.6 G/DL (ref 6–8.5)
RBC # BLD AUTO: 3.56 X10E6/UL (ref 3.77–5.28)
SODIUM SERPL-SCNC: 141 MMOL/L (ref 134–144)
TIBC SERPL-MCNC: 300 UG/DL (ref 250–450)
UIBC SERPL-MCNC: 207 UG/DL (ref 131–425)
WBC # BLD AUTO: 3.3 X10E3/UL (ref 3.4–10.8)

## 2020-11-10 NOTE — PROGRESS NOTES
White blood cell count and ANC mildly low. Please repeat CBC with diff in 2-4 weeks to re-check. Order placed. Otherwise, labs are within normal range, including normal HgbA1c (no diabetes).

## 2020-11-13 DIAGNOSIS — D72.9 ABNORMAL WBC COUNT: Primary | ICD-10-CM

## 2020-11-18 ENCOUNTER — VIRTUAL VISIT (OUTPATIENT)
Dept: INTERNAL MEDICINE CLINIC | Age: 35
End: 2020-11-18
Payer: MEDICAID

## 2020-11-18 DIAGNOSIS — D72.819 LEUKOPENIA, UNSPECIFIED TYPE: ICD-10-CM

## 2020-11-18 DIAGNOSIS — F41.0 PANIC ATTACK: ICD-10-CM

## 2020-11-18 DIAGNOSIS — R21 RASH: Primary | ICD-10-CM

## 2020-11-18 DIAGNOSIS — F41.9 ANXIETY: ICD-10-CM

## 2020-11-18 DIAGNOSIS — Z20.828 EXPOSURE TO HERPES: ICD-10-CM

## 2020-11-18 PROCEDURE — 99214 OFFICE O/P EST MOD 30 MIN: CPT | Performed by: INTERNAL MEDICINE

## 2020-11-18 RX ORDER — BUSPIRONE HYDROCHLORIDE 5 MG/1
5 TABLET ORAL
Qty: 30 TAB | Refills: 1 | Status: ON HOLD | OUTPATIENT
Start: 2020-11-18 | End: 2020-12-21

## 2020-11-18 RX ORDER — ESCITALOPRAM OXALATE 5 MG/1
5 TABLET ORAL DAILY
Qty: 30 TAB | Refills: 5 | Status: ON HOLD | OUTPATIENT
Start: 2020-11-18 | End: 2020-12-21

## 2020-11-18 NOTE — PROGRESS NOTES
Seamus Monroe is a 30 y.o. female who was seen by synchronous (real-time) audio-video technology on 11/18/2020 for Anxiety        Assessment & Plan:   Diagnoses and all orders for this visit:    1. Rash    She has been having this chronic rash on her gluteal area for last 3 or 4 years. She has history of genital herpes in the past.  No rash seems to dried up right now. She is wondering if is genital herpes. I have told her that is probably is general herpes. But will check lab work to see if she has herpes Simplex IgG type II antibody. If needed she might need to take Valtrex courses if she has any outbreak comes. She is not sexually active. Not need to take Valtrex regular basis. 2. Anxiety    Her anxiety is better but would like to continue Lexapro for now. Will give,  -     escitalopram oxalate (LEXAPRO) 5 mg tablet; Take 1 Tab by mouth daily. -     busPIRone (BUSPAR) 5 mg tablet; Take 1 Tab by mouth two (2) times daily as needed (anxiety). 3. Panic attack  -     escitalopram oxalate (LEXAPRO) 5 mg tablet; Take 1 Tab by mouth daily. -     busPIRone (BUSPAR) 5 mg tablet; Take 1 Tab by mouth two (2) times daily as needed (anxiety). 4. Exposure to herpes  -     HSV TYPE 2-SPECIFIC ABS, IGG W/REFL SUPPLEMENTAL TESTING; Future    5. Leukopenia, unspecified type    We will repeat,  -     CBC WITH AUTOMATED DIFF; Future        I spent at least 25 minutes on this visit with this established patient. Subjective:     Ms. Rene Robb is here for follow-up. 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 Still sometimes she gets panic attack in every 7 to 10 days. Wondering whether she should continue Lexapro for now or not. She used to take lorazepam, not taking anymore. Noticed to have a rash  On her butt cheek on and off for past several years. She is wondering if it is herpes.   She has history of a herpes outbreak in the past.  Not sexually active right now. Lab reviewed, WBC count was low. Need to repeat lab work. Need flu shot. Prior to Admission medications    Medication Sig Start Date End Date Taking? Authorizing Provider   escitalopram oxalate (LEXAPRO) 5 mg tablet Take 1 Tab by mouth daily. 11/18/20  Yes Cha Palacios MD   busPIRone (BUSPAR) 5 mg tablet Take 1 Tab by mouth two (2) times daily as needed (anxiety). 11/18/20  Yes Cha Palacios MD   promethazine (PHENERGAN) 12.5 mg tablet Take 1 Tab by mouth every six (6) hours as needed for Nausea. 10/23/20   Donell Goodman NP   escitalopram oxalate (LEXAPRO) 5 mg tablet Take 1 Tab by mouth daily. 10/23/20 11/18/20  Donell Goodman NP   ondansetron (Zofran ODT) 4 mg disintegrating tablet Take 1 Tab by mouth every eight (8) hours as needed for Nausea. 10/20/20   Samantha Ly MD   LORazepam (ATIVAN) 0.5 mg tablet Take 0.5 Tabs by mouth every eight (8) hours as needed for Anxiety. Max Daily Amount: 0.75 mg. 1/27/20 11/18/20  Danita Cordero NP     Past Medical History:   Diagnosis Date    Abnormal Pap smear     Follow-ups normal    Herpes simplex without mention of complication     HX OTHER MEDICAL     Endometriosus       ROS significant for rash and stress and anxiety.     Objective:     Patient-Reported Vitals 11/18/2020   Patient-Reported Weight 130 lb          Constitutional: [x] Appears well-developed and well-nourished [x] No apparent distress      [] Abnormal -     Mental status: [x] Alert and awake  [x] Oriented to person/place/time [x] Able to follow commands    [] Abnormal -        HENT: [x] Normocephalic, atraumatic  [] Abnormal -   [x] Mouth/Throat: Mucous membranes are moist    External Ears [x] Normal  [] Abnormal -    Neck: [x] No visualized mass [] Abnormal -     Pulmonary/Chest: [x] Respiratory effort normal   [x] No visualized signs of difficulty breathing or respiratory distress        [] Abnormal -      Musculoskeletal:   [x] Normal gait with no signs of ataxia         [x] Normal range of motion of neck        [] Abnormal -     Neurological:        [x] No Facial Asymmetry (Cranial nerve 7 motor function) (limited exam due to video visit)          [x] No gaze palsy        [] Abnormal -          Skin:        Right gluteal area: Approximately 2 x 3 cm area of dark pigmented lesion almost looks like dried crypt vesicle. No active lesion right now. Slightly itchy. Psychiatric:       [x] Normal Affect [] Abnormal -        [x] No Hallucinations  Stress and anxiety present. Other pertinent observable physical exam findings:-        We discussed the expected course, resolution and complications of the diagnosis(es) in detail. Medication risks, benefits, costs, interactions, and alternatives were discussed as indicated. I advised her to contact the office if her condition worsens, changes or fails to improve as anticipated. She expressed understanding with the diagnosis(es) and plan. Dilma Ram, who was evaluated through a patient-initiated, synchronous (real-time) audio-video encounter, and/or her healthcare decision maker, is aware that it is a billable service, with coverage as determined by her insurance carrier. She provided verbal consent to proceed: Yes, and patient identification was verified. It was conducted pursuant to the emergency declaration under the [facility], [address] [place] [address] authority and the [employer] General Act. A caregiver was present when appropriate. Ability to conduct physical exam was limited. I was in the office. The patient was at home.       Ravi Patterson MD

## 2020-11-18 NOTE — PROGRESS NOTES
Areli Varma is a 30 y.o. female  Chief Complaint   Patient presents with   [place] Maintenance Due   Topic Date Due    DTaP/Tdap/Td series (1 - Tdap) 10/19/2007    Flu Vaccine (1) 09/01/2020     There were no vitals taken for this visit.     Pt weight: 130 lb    Pt phone number: [phone]

## 2020-12-16 ENCOUNTER — TRANSCRIBE ORDER (OUTPATIENT)
Dept: REGISTRATION | Age: 35
End: 2020-12-16

## 2020-12-16 ENCOUNTER — HOSPITAL ENCOUNTER (OUTPATIENT)
Dept: PREADMISSION TESTING | Age: 35
Discharge: HOME | End: 2020-12-16
Payer: MEDICAID

## 2020-12-16 DIAGNOSIS — Z01.812 PRE-PROCEDURE LAB EXAM: Primary | ICD-10-CM

## 2020-12-16 DIAGNOSIS — Z01.812 PRE-PROCEDURE LAB EXAM: ICD-10-CM

## 2020-12-16 PROCEDURE — 87635 SARS-COV-2 COVID-19 AMP PRB: CPT

## 2020-12-17 LAB
BASOPHILS # BLD AUTO: 0 X10E3/UL (ref 0–0.2)
BASOPHILS NFR BLD AUTO: 1 %
EOSINOPHIL # BLD AUTO: 0 X10E3/UL (ref 0–0.4)
EOSINOPHIL NFR BLD AUTO: 1 %
ERYTHROCYTE [DISTWIDTH] IN BLOOD BY AUTOMATED COUNT: 12.7 % (ref 11.7–15.4)
HCT VFR BLD AUTO: 35.6 % (ref 34–46.6)
HGB BLD-MCNC: 12.1 G/DL (ref 11.1–15.9)
HSV2 IGG SER IA-ACNC: 7.96 INDEX (ref 0–0.9)
IMM GRANULOCYTES # BLD AUTO: 0 X10E3/UL (ref 0–0.1)
IMM GRANULOCYTES NFR BLD AUTO: 0 %
LYMPHOCYTES # BLD AUTO: 1.4 X10E3/UL (ref 0.7–3.1)
LYMPHOCYTES NFR BLD AUTO: 33 %
MCH RBC QN AUTO: 32.2 PG (ref 26.6–33)
MCHC RBC AUTO-ENTMCNC: 34 G/DL (ref 31.5–35.7)
MCV RBC AUTO: 95 FL (ref 79–97)
MONOCYTES # BLD AUTO: 0.3 X10E3/UL (ref 0.1–0.9)
MONOCYTES NFR BLD AUTO: 8 %
NEUTROPHILS # BLD AUTO: 2.6 X10E3/UL (ref 1.4–7)
NEUTROPHILS NFR BLD AUTO: 57 %
PLATELET # BLD AUTO: 158 X10E3/UL (ref 150–450)
RBC # BLD AUTO: 3.76 X10E6/UL (ref 3.77–5.28)
SARS-COV-2, COV2NT: NOT DETECTED
WBC # BLD AUTO: 4.4 X10E3/UL (ref 3.4–10.8)

## 2020-12-18 NOTE — PROGRESS NOTES
She was exposed to genital herpes in the past.  She can take Valtrex 500 mg once a day to prevent transmission if her partner has no genital herpes.

## 2020-12-21 ENCOUNTER — ANESTHESIA (OUTPATIENT)
Dept: ENDOSCOPY | Age: 35
End: 2020-12-21
Payer: MEDICAID

## 2020-12-21 ENCOUNTER — ANESTHESIA EVENT (OUTPATIENT)
Dept: ENDOSCOPY | Age: 35
End: 2020-12-21
Payer: MEDICAID

## 2020-12-21 ENCOUNTER — PATIENT MESSAGE (OUTPATIENT)
Dept: INTERNAL MEDICINE CLINIC | Age: 35
End: 2020-12-21

## 2020-12-21 ENCOUNTER — HOSPITAL ENCOUNTER (OUTPATIENT)
Age: 35
Setting detail: OUTPATIENT SURGERY
Discharge: HOME | End: 2020-12-21
Attending: INTERNAL MEDICINE | Admitting: INTERNAL MEDICINE
Payer: MEDICAID

## 2020-12-21 VITALS
DIASTOLIC BLOOD PRESSURE: 80 MMHG | OXYGEN SATURATION: 100 % | BODY MASS INDEX: 24.56 KG/M2 | SYSTOLIC BLOOD PRESSURE: 110 MMHG | RESPIRATION RATE: 18 BRPM | HEART RATE: 74 BPM | HEIGHT: 61 IN | TEMPERATURE: 98.4 F

## 2020-12-21 PROCEDURE — 76060000031 HC ANESTHESIA FIRST 0.5 HR: Performed by: INTERNAL MEDICINE

## 2020-12-21 PROCEDURE — 88305 TISSUE EXAM BY PATHOLOGIST: CPT

## 2020-12-21 PROCEDURE — 74011250636 HC RX REV CODE- 250/636: Performed by: NURSE ANESTHETIST, CERTIFIED REGISTERED

## 2020-12-21 PROCEDURE — 74011000250 HC RX REV CODE- 250: Performed by: NURSE ANESTHETIST, CERTIFIED REGISTERED

## 2020-12-21 PROCEDURE — 77030021593 HC FCPS BIOP ENDOSC BSC -A: Performed by: INTERNAL MEDICINE

## 2020-12-21 PROCEDURE — 2709999900 HC NON-CHARGEABLE SUPPLY: Performed by: INTERNAL MEDICINE

## 2020-12-21 PROCEDURE — 76040000019: Performed by: INTERNAL MEDICINE

## 2020-12-21 RX ORDER — EPINEPHRINE 0.1 MG/ML
1 INJECTION INTRACARDIAC; INTRAVENOUS
Status: DISCONTINUED | OUTPATIENT
Start: 2020-12-21 | End: 2020-12-21 | Stop reason: HOSPADM

## 2020-12-21 RX ORDER — SODIUM CHLORIDE 0.9 % (FLUSH) 0.9 %
5-40 SYRINGE (ML) INJECTION EVERY 8 HOURS
Status: DISCONTINUED | OUTPATIENT
Start: 2020-12-21 | End: 2020-12-21 | Stop reason: HOSPADM

## 2020-12-21 RX ORDER — FLUMAZENIL 0.1 MG/ML
0.2 INJECTION INTRAVENOUS
Status: DISCONTINUED | OUTPATIENT
Start: 2020-12-21 | End: 2020-12-21 | Stop reason: HOSPADM

## 2020-12-21 RX ORDER — SODIUM CHLORIDE 9 MG/ML
50 INJECTION, SOLUTION INTRAVENOUS CONTINUOUS
Status: DISCONTINUED | OUTPATIENT
Start: 2020-12-21 | End: 2020-12-21 | Stop reason: HOSPADM

## 2020-12-21 RX ORDER — DEXTROMETHORPHAN/PSEUDOEPHED 2.5-7.5/.8
1.2 DROPS ORAL
Status: DISCONTINUED | OUTPATIENT
Start: 2020-12-21 | End: 2020-12-21 | Stop reason: HOSPADM

## 2020-12-21 RX ORDER — LIDOCAINE HYDROCHLORIDE 20 MG/ML
INJECTION, SOLUTION EPIDURAL; INFILTRATION; INTRACAUDAL; PERINEURAL AS NEEDED
Status: DISCONTINUED | OUTPATIENT
Start: 2020-12-21 | End: 2020-12-21 | Stop reason: HOSPADM

## 2020-12-21 RX ORDER — NALOXONE HYDROCHLORIDE 0.4 MG/ML
0.4 INJECTION, SOLUTION INTRAMUSCULAR; INTRAVENOUS; SUBCUTANEOUS
Status: DISCONTINUED | OUTPATIENT
Start: 2020-12-21 | End: 2020-12-21 | Stop reason: HOSPADM

## 2020-12-21 RX ORDER — SODIUM CHLORIDE 9 MG/ML
INJECTION, SOLUTION INTRAVENOUS
Status: DISCONTINUED | OUTPATIENT
Start: 2020-12-21 | End: 2020-12-21 | Stop reason: HOSPADM

## 2020-12-21 RX ORDER — SODIUM CHLORIDE 0.9 % (FLUSH) 0.9 %
5-40 SYRINGE (ML) INJECTION AS NEEDED
Status: DISCONTINUED | OUTPATIENT
Start: 2020-12-21 | End: 2020-12-21 | Stop reason: HOSPADM

## 2020-12-21 RX ORDER — ATROPINE SULFATE 0.1 MG/ML
0.5 INJECTION INTRAVENOUS
Status: DISCONTINUED | OUTPATIENT
Start: 2020-12-21 | End: 2020-12-21 | Stop reason: HOSPADM

## 2020-12-21 RX ORDER — PROPOFOL 10 MG/ML
INJECTION, EMULSION INTRAVENOUS AS NEEDED
Status: DISCONTINUED | OUTPATIENT
Start: 2020-12-21 | End: 2020-12-21 | Stop reason: HOSPADM

## 2020-12-21 RX ADMIN — PROPOFOL 50 MG: 10 INJECTION, EMULSION INTRAVENOUS at 15:39

## 2020-12-21 RX ADMIN — PROPOFOL 30 MG: 10 INJECTION, EMULSION INTRAVENOUS at 15:50

## 2020-12-21 RX ADMIN — PROPOFOL 40 MG: 10 INJECTION, EMULSION INTRAVENOUS at 15:43

## 2020-12-21 RX ADMIN — PROPOFOL 40 MG: 10 INJECTION, EMULSION INTRAVENOUS at 15:41

## 2020-12-21 RX ADMIN — SODIUM CHLORIDE: 900 INJECTION, SOLUTION INTRAVENOUS at 15:37

## 2020-12-21 RX ADMIN — LIDOCAINE HYDROCHLORIDE 80 MG: 20 INJECTION, SOLUTION EPIDURAL; INFILTRATION; INTRACAUDAL; PERINEURAL at 15:38

## 2020-12-21 RX ADMIN — PROPOFOL 40 MG: 10 INJECTION, EMULSION INTRAVENOUS at 15:45

## 2020-12-21 RX ADMIN — PROPOFOL 30 MG: 10 INJECTION, EMULSION INTRAVENOUS at 15:47

## 2020-12-21 RX ADMIN — PROPOFOL 100 MG: 10 INJECTION, EMULSION INTRAVENOUS at 15:38

## 2020-12-21 RX ADMIN — PROPOFOL 20 MG: 10 INJECTION, EMULSION INTRAVENOUS at 15:52

## 2020-12-21 RX ADMIN — SODIUM CHLORIDE: 900 INJECTION, SOLUTION INTRAVENOUS at 15:28

## 2020-12-21 NOTE — PERIOP NOTES

## 2020-12-21 NOTE — H&P
[address] [facility].  [facility], [address]  [phone]                                History and Physical     NAME: Marybel Patel   :  1985   MRN:  [id number]     HPI:  The patient was seen and examined. Past Surgical History:   Procedure Laterality Date    HX OTHER SURGICAL      Exploratory  laprascopic for endometriosus     Past Medical History:   Diagnosis Date    Abnormal Pap smear     Follow-ups normal    Herpes simplex without mention of complication     HX OTHER MEDICAL     Endometriosus     Social History     Tobacco Use    Smoking status: Never Smoker    Smokeless tobacco: Never Used   Substance Use Topics    Alcohol use: Yes     Alcohol/week: 0.0 standard drinks     Comment: Occasionally    Drug use: No     No Known Allergies  Family History   Problem Relation Age of Onset    Cancer Maternal Grandmother         Breast    No Known Problems Mother     Arthritis-osteo Father     No Known Problems Sister     No Known Problems Brother     No Known Problems Sister      Current Facility-Administered Medications   Medication Dose Route Frequency    0.9% sodium chloride infusion  50 mL/hr IntraVENous CONTINUOUS    sodium chloride (NS) flush 5-40 mL  5-40 mL IntraVENous Q8H    sodium chloride (NS) flush 5-40 mL  5-40 mL IntraVENous PRN    naloxone (NARCAN) injection 0.4 mg  0.4 mg IntraVENous Multiple    flumazeniL (ROMAZICON) 0.1 mg/mL injection 0.2 mg  0.2 mg IntraVENous Multiple    simethicone (MYLICON) 44KC/5.3AS oral drops 80 mg  1.2 mL Oral Multiple    atropine injection 0.5 mg  0.5 mg IntraVENous ONCE PRN    EPINEPHrine (ADRENALIN) 0.1 mg/mL syringe 1 mg  1 mg Endoscopically ONCE PRN     Facility-Administered Medications Ordered in Other Encounters   Medication Dose Route Frequency    0.9% sodium chloride infusion   IntraVENous CONTINUOUS         PHYSICAL EXAM:  General: WD, WN.  Alert, cooperative, no acute distress HEENT: NC, Atraumatic. PERRLA, EOMI. Anicteric sclerae. Lungs:  CTA Bilaterally. No Wheezing/Rhonchi/Rales. Heart:  Regular  rhythm,  No murmur, No Rubs, No Gallops  Abdomen: Soft, Non distended, Non tender. +Bowel sounds, no HSM  Extremities: No c/c/e  Neurologic:  CN 2-12 gi, Alert and oriented X 3. No acute neurological distress   Psych:   Good insight. Not anxious nor agitated. The heart, lungs and mental status were satisfactory for the administration of MAC sedation and for the procedure. Mallampati score: 3     The patient was counseled at length about the risks of bob Covid-19 in the karis-operative and post-operative states including the recovery window of their procedure. The patient was made aware that bob Covid-19 after a surgical procedure may worsen their prognosis for recovering from the virus and lend to a higher morbidity and or mortality risk. The patient was given the options of postponing their procedure. All of the risks, benefits, and alternatives were discussed. The patient does  wish to proceed with the procedure.       Assessment:   · Nausea  · Change in bowel habits  · Weight loss    Plan:   · Endoscopic procedure  · MAC sedation   ·

## 2020-12-21 NOTE — PROCEDURES
118 [facility].  217 [facility] [address], [address]  [phone]                           Colonoscopy and EGD Procedure Note      Indications:    Change in bowel habits, weight loss     :  Twin Orr MD    Staff: Endoscopy Technician-1: Humza Tello  Endoscopy RN-1: Rhiannon Huntley RN     Implants: none    Referring Provider: Genoveva Ray MD    Sedation:  MAC anesthesia    Procedure Details:  After informed consent was obtained with all risks and benefits of procedure explained and preoperative exam completed, the patient was taken to the endoscopy suite and placed in the left lateral decubitus position. Upon sequential sedation as per above, a digital rectal exam was performed  And was normal.  The Olympus videocolonoscope  was inserted in the rectum and carefully advanced to the cecum, which was identified by the ileocecal valve and appendiceal orifice, terminal ileum. The quality of preparation was good. The colonoscope was slowly withdrawn with careful evaluation between folds. Retroflexion in the rectum was performed and was normal..     Colon Findings:   Rectum: no mucosal lesion appreciated  Grade 1 internal hemorrhoid(s); Sigmoid: no mucosal lesion appreciated      - Diverticulosis  Descending Colon: no mucosal lesion appreciated  Transverse Colon: no mucosal lesion appreciated  Ascending Colon: no mucosal lesion appreciated  Cecum: 1  Sessile polyp(s), the largest 4 mm in size; Terminal Ileum: Patchy erythema and nodularity was noted in the terminal ileum      Following sequential administration of sedation as per above, the [id number] gastroscope was inserted into the mouth and advanced under direct vision to second portion of the duodenum. A careful inspection was made as the gastroscope was withdrawn, including a retroflexed view of the proximal stomach; findings and interventions are described below.       EGD Findings:  Esophagus:normal  Stomach:mild erythema was noted in  body, antrum  Duodenum/jejunum:normal      Therapies:  biopsy of stomach body, antrum  biopsy of cecum  biopsy of duodenal second portion  1 complete polypectomy were performed using cold biopsy forceps and the polyps were  retrieved    Complications:   None; patient tolerated the procedure well. Impression:    See Postoperative diagnosis above    Recommendations:  -Continue acid suppression. ,   -Await pathology. ,   -Low fat diet. -Repeat colonoscopy in 5 years.     -Resume normal medication(s). Interventions:  biopsy of stomach body, antrum  biopsy of cecum  biopsy of duodenal second portion  1 complete polypectomy were performed using cold biopsy forceps and the polyps were  retrieved    Complications: None. Specimen Removed:    ID Type Source Tests Collected by Time Destination   1 : duodenum Preservative Duodenum  Bjorn Gonzales MD 12/21/2020 1540 Pathology   2 : Gastric Preservative Gastric  Bjorn Gonzales MD 12/21/2020 1541 Pathology   3 : terminal ileum Preservative Ileum, Terminal  Bjorn Gonzales MD 12/21/2020 1550 Pathology   4 : polyp Preservative Cecum  Bjorn Gonzales MD 12/21/2020 1552 Pathology       EBL:  None. Discharge Disposition:  Home in the company of a  when able to ambulate.     Jocelyne Velez MD  12/21/2020  4:06 PM

## 2020-12-21 NOTE — ANESTHESIA POSTPROCEDURE EVALUATION
Procedure(s):  COLONOSCOPY,EGD  ESOPHAGOGASTRODUODENOSCOPY (EGD)    :-  ESOPHAGOGASTRODUODENAL (EGD) BIOPSY  ENDOSCOPIC POLYPECTOMY  COLON BIOPSY. MAC    <BSHSIANPOST>    INITIAL Post-op Vital signs:   Vitals Value Taken Time   /114 12/21/20 1625   Temp 36.9 °C (98.4 °F) 12/21/20 1615   Pulse 56 12/21/20 1630   Resp 17 12/21/20 1630   SpO2 100 % 12/21/20 1630   Vitals shown include unvalidated device data.

## 2020-12-21 NOTE — DISCHARGE INSTRUCTIONS
118 [facility].  217 [facility] [place]  [id number]  1985    DISCOMFORT:  Redness at IV site- apply warm compress to area; if redness or soreness persist- contact your physician  There may be a slight amount of blood passed from the rectum  Gaseous discomfort- walking, belching will help relieve any discomfort    DIET:   Low fat diet. - however -  remember your colon is empty and a heavy meal will produce gas. Avoid these foods:  vegetables, fried / greasy foods, carbonated drinks for today   You may not  drink alcoholic beverages for at least 12 hours      ACTIVITY  Spend the remainder of the day resting -  avoid any strenuous activity, then you may resume your normal daily activities   You may not operate a vehicle for 12 hours  You may not  engage in an occupation involving machinery or appliances for rest of today  Avoid making any critical decisions for at least 24 hour    CALL M.D. ANY SIGN OF   Increasing pain, nausea, vomiting  Abdominal distension (swelling)  New increased bleeding (oral or rectal)  Fever (chills)  Pain in chest area  Bloody discharge from nose or mouth  Shortness of breath    You may not  take any Advil, Aspirin, Ibuprofen, Motrin, Aleve, or Goodys for 7 days, ONLY  Tylenol as needed for pain. Post procedure diagnosis: Gastritis  Colon polyp  Diverticulosis      Follow-up Instructions:   Call Dr. Boogie Rojas for any questions or problems. If we took a biopsy please call the office within 2 weeks to discuss your pathology results. Telephone # [phone]       Patient Education        Diverticulosis: Care Instructions  Your Care Instructions  In diverticulosis, pouches called diverticula form in the wall of the large intestine (colon). The pouches do not cause any pain or other symptoms. Most people who have diverticulosis do not know they have it. But the pouches sometimes bleed, and if they become infected, they can cause pain and other symptoms. When this happens, it is called diverticulitis. Diverticula form when pressure pushes the wall of the colon outward at certain weak points. A diet that is too low in fiber can cause diverticula. Follow-up care is a key part of your treatment and safety. Be sure to make and go to all appointments, and call your doctor if you are having problems. It's also a good idea to know your test results and keep a list of the medicines you take. How can you care for yourself at home? · Include fruits, leafy green vegetables, beans, and whole grains in your diet each day. These foods are high in fiber. · Take a fiber supplement, such as Citrucel or Metamucil, every day if needed. Read and follow all instructions on the label. · Drink plenty of fluids, enough so that your urine is light yellow or clear like water. If you have kidney, heart, or liver disease and have to limit fluids, talk with your doctor before you increase the amount of fluids you drink. · Get at least 30 minutes of exercise on most days of the week. Walking is a good choice. You also may want to do other activities, such as running, swimming, cycling, or playing tennis or team sports. · Cut out foods that cause gas, pain, or other symptoms. When should you call for help? Call your doctor now or seek immediate medical care if:    · You have belly pain.     · You pass maroon or very bloody stools.     · You have a fever.     · You have nausea and vomiting.     · You have unusual changes in your bowel movements or abdominal swelling.     · You have burning pain when you urinate.     · You have abnormal vaginal discharge.     · You have shoulder pain.     · You have cramping pain that does not get better when you have a bowel movement or pass gas.     · You pass gas or stool from your urethra while urinating.    Watch closely for changes in your health, and be sure to contact your doctor if you have any problems. Where can you learn more? Go to http://www.gray.com/  Enter [id number] in the search box to learn more about \"Diverticulosis: Care Instructions. \"  Current as of: April 15, 2020               Content Version: 12.6  © 3906-8554 [employer]. Care instructions adapted under license by [employer] (which disclaims liability or warranty for this information). If you have questions about a medical condition or this instruction, always ask your healthcare professional. [address] any warranty or liability for your use of this information. Learning About Coronavirus [phone])  Coronavirus [phone]): Overview  What is coronavirus (COVID-19)? The coronavirus disease (COVID-19) is caused by a virus. It is an illness that was first found in [place], [place], in December 2019. It has since spread worldwide. The virus can cause fever, cough, and trouble breathing. In severe cases, it can cause pneumonia and make it hard to breathe without help. It can cause death. Coronaviruses are a large group of viruses. They cause the common cold. They also cause more serious illnesses like Middle East respiratory syndrome (MERS) and severe acute respiratory syndrome (SARS). COVID-19 is caused by a novel coronavirus. That means it's a new type that has not been seen in people before. This virus spreads person-to-person through droplets from coughing and sneezing. It can also spread when you are close to someone who is infected. And it can spread when you touch something that has the virus on it, such as a doorknob or a tabletop. What can you do to protect yourself from coronavirus (COVID-19)? The best way to protect yourself from getting sick is to:  · Avoid areas where there is an outbreak. · Avoid contact with people who may be infected.   · Wash your hands often with soap or alcohol-based hand sanitizers. · Avoid crowds and try to stay at least 6 feet away from other people. · Wash your hands often, especially after you cough or sneeze. Use soap and water, and scrub for at least 20 seconds. If soap and water aren't available, use an alcohol-based hand . · Avoid touching your mouth, nose, and eyes. What can you do to avoid spreading the virus to others? To help avoid spreading the virus to others:  · Cover your mouth with a tissue when you cough or sneeze. Then throw the tissue in the trash. · Use a disinfectant to clean things that you touch often. · Stay home if you are sick or have been exposed to the virus. Don't go to school, work, or public areas. And don't use public transportation. · If you are sick:  ? Leave your home only if you need to get medical care. But call the doctor's office first so they know you're coming. And wear a face mask, if you have one.  ? If you have a face mask, wear it whenever you're around other people. It can help stop the spread of the virus when you cough or sneeze. ? Clean and disinfect your home every day. Use household  and disinfectant wipes or sprays. Take special care to clean things that you grab with your hands. These include doorknobs, remote controls, phones, and handles on your refrigerator and microwave. And don't forget countertops, tabletops, bathrooms, and computer keyboards. When to call for help  Call 911 anytime you think you may need emergency care. For example, call if:  · You have severe trouble breathing. (You can't talk at all.)  · You have constant chest pain or pressure. · You are severely dizzy or lightheaded. · You are confused or can't think clearly. · Your face and lips have a blue color. · You pass out (lose consciousness) or are very hard to wake up. Call your doctor now if you develop symptoms such as:  · Shortness of breath. · Fever. · Cough.   If you need to get care, call ahead to the doctor's office for instructions before you go. Make sure you wear a face mask, if you have one, to prevent exposing other people to the virus. Where can you get the latest information? The following health organizations are tracking and studying this virus. Their websites contain the most up-to-date information. Saud Alexandra also learn what to do if you think you may have been exposed to the virus. · U.S. Centers for Disease Control and Prevention (CDC): The CDC provides updated news about the disease and travel advice. The website also tells you how to prevent the spread of infection. www.cdc.gov  · World Health Organization [facility]): WHO offers information about the virus outbreaks. WHO also has travel advice. www.who.int  Current as of: April 1, 2020               Content Version: 12.4  © 5100-3090 [employer]. Care instructions adapted under license by your healthcare professional. If you have questions about a medical condition or this instruction, always ask your healthcare professional. [address] any warranty or liability for your use of this information.

## 2021-03-15 NOTE — PROGRESS NOTES
Medicare Wellness Visit  Plan for Preventive Care    A good way for you to stay healthy is to use preventive care.  Medicare covers many services that can help you stay healthy.* The goal of these services is to find any health problems as quickly as possible. Finding problems early can help make them easier to treat.  Your personal plan below lists the services you may need and when they are due.     Health Maintenance Summary     Hepatitis B Vaccine (1 of 3 - Risk 3-dose series)  Overdue - never done    Diabetes Foot Exam (Yearly)  Due since 3/11/2021    COVID-19 Vaccine (2 - Pfizer 2-dose series)  Next due on 3/26/2021    Diabetes Eye Exam (Yearly)  Next due on 7/29/2021    Diabetes A1C (Every 6 Months)  Next due on 9/8/2021    DM/CKD GFR (Yearly)  Next due on 3/8/2022    Medicare Wellness Visit (Yearly)  Next due on 3/15/2022    Depression Screening (Yearly)  Next due on 3/15/2022    DTaP/Tdap/Td Vaccine (2 - Td)  Next due on 1/7/2023    Pneumococcal Vaccine 0-64   Completed    Influenza Vaccine   Completed    Meningococcal Vaccine   Aged Out    HPV Vaccine   Aged Out           Preventive Care for Women and Men    Heart Screenings (Cardiovascular):  · Blood tests are used to check your cholesterol, lipid and triglyceride levels. High levels can increase your risk for heart disease and stroke. High levels can be treated with medications, diet and exercise. Lowering your levels can help keep your heart and blood vessels healthy.  Your provider will order these tests if they are needed.    · An ultrasound is done to see if you have an abdominal aortic aneurysm (AAA).  This is an enlargement of one of the main blood vessels that delivers blood to the body.   In the [place], 9,000 deaths are caused by AAA.  You may not even know you have this problem and as many as 1 in 3 people will have a serious problem if it is not treated.  Early diagnosis allows for more effective treatment and cure.  If you have a  ADVISED PATIENT OF THE FOLLOWING HEALTH MAINTAINCE DUE  Health Maintenance Due   Topic Date Due    Flu Vaccine (1) 09/01/2020      Chief Complaint   Patient presents with   [facility] Follow Up     ED [facility]  10/20 vomiting,abdominal pain     Abdominal Pain     started in Jan     Vomiting     passed out from vomiting and went to ER    Panic Attack     2 last week        1. Have you been to the ER, urgent care clinic since your last visit? Hospitalized since your last visit? ED [facility] 10/20/20 for vomiting , abdominal pain     2. Have you seen or consulted any other health care providers outside of the [address] [place] [address] since your last visit? Include any DEXA scan, mammography  or colon screening. No    3. Do you have an Advance Directive on file? no    4. Do you have a DNR on file? no    Patient is accompanied by self I have received verbal consent from Vinay Rosa to discuss any/all medical information while they are present in the room. No flowsheet data found. [id number] [place], [place] - [address]  [address] [facility] [address]  Phone: [phone] Fax: [phone]    [facility] [address], [address] [facility] [address] [facility] [address]  Phone: [phone] Fax: [phone]    [facility] [place] [id number]  Phone: [phone] Fax: [phone]        Patient reminded during visit to bring all medication bottles, OTC medications to all appointments. family history of AAA or are a male age 65-75 who has smoked, you are at higher risk of an AAA.  Your provider can order this test, if needed.    Colorectal Screening:  · There are many tests that are used to check for cancer of your colon and rectum. You and your provider should discuss what test is best for you and when to have it done.  Options include:  · Screening Colonoscopy: exam of the entire colon, seen through a flexible lighted tube.  · Flexible Sigmoidoscopy: exam of the last third (sigmoid portion) of the colon and rectum, seen through a flexible lighted tube.  · Cologuard DNA stool test: a sample of your stool is used to screen for cancer and unseen blood in your stool.  · Fecal Occult Blood Test: a sample of your stool is studied to find any unseen blood    Flu Shot:  · An immunization that helps to prevent influenza (the flu). You should get this every year. The best time to get the shot is in the fall.    Pneumococcal Shot:  • Vaccines are available that can help prevent pneumococcal disease, which is any type of infection caused by Streptococcus pneumoniae bacteria.   Their use can prevent some cases of pneumonia, meningitis, and sepsis. There are two types of pneumococcal vaccines:   o Conjugate vaccines (PCV-13 or Prevnar 13®) - helps protect against the 13 types of pneumococcal bacteria that are the most common causes of serious infections in children and adults.    o Polysaccharide vaccine (PPSV23 or Nnyxlaitg58®) - helps protect against 23 types of pneumococcal bacteria for patients who are recommended to get it.  These vaccines should be given at least 12 months apart.  A booster is usually not needed.     Hepatitis B Shot:  · An immunization that helps to protect people from getting Hepatitis B. Hepatitis B is a virus that spreads through contact with infected blood or body fluids. Many people with the virus do not have symptoms.  The virus can lead to serious problems, such as liver  disease. Some people are at higher risk than others. Your doctor will tell you if you need this shot.     Diabetes Screening:  · A test to measure sugar (glucose) in your blood is called a fasting blood sugar. Fasting means you cannot have food or drink for at least 8 hours before the test. This test can detect diabetes long before you may notice symptoms.    Glaucoma Screening:  · Glaucoma screening is performed by your eye doctor. The test measures the fluid pressure inside your eyes to determine if you have glaucoma.     Hepatitis C Screening:  · A blood test to see if you have the hepatitis C virus.  Hepatitis C attacks the liver and is a major cause of chronic liver disease.  Medicare will cover a single screening for all adults born between 1945 & 1965, or high risk patients (people who have injected illegal drugs or people who have had blood transfusions).  High risk patients who continue to inject illegal drugs can be screened for Hepatitis C every year.    Smoking and Tobacco-Use Cessation Counseling:  · Tobacco is the single greatest cause of disease and early death in our country today. Medication and counseling together can increase a person’s chance of quitting for good.   · Medicare covers two quitting attempts per year, with four counseling sessions per attempt (eight sessions in a 12 month period)    Preventive Screening tests for Women    Screening Mammograms and Breast Exams:  · An x-ray of your breasts to check for breast cancer before you or your doctor may be able to feel it.  If breast cancer is found early it can usually be treated with success.    Pelvic Exams and Pap Tests:  · An exam to check for cervical and vaginal cancer. A Pap test is a lab test in which cells are taken from your cervix and sent to the lab to look for signs of cervical cancer. If cancer of the cervix is found early, chances for a cure are good. Testing can generally end at age 65, or if a woman has a hysterectomy for a  benign condition. Your provider may recommend more frequent testing if certain abnormal results are found.    Bone Mass Measurements:  · A painless x-ray of your bone density to see if you are at risk for a broken bone. Bone density refers to the thickness of bones or how tightly the bone tissue is packed.    Preventive Screening tests for Men    Prostate Screening:  · Should you have a prostate cancer test (PSA)?  It is up to you to decide if you want a prostate cancer test. Talk to your clinician to find out if the test is right for you.  Things for you to consider and talk about should include:  · Benefits and harms of the test  · Your family history  · How your race/ethnicity may influence the test  · If the test may impact other medical conditions you have  · Your values on screenings and treatments    *Medicare pays for many preventive services to keep you healthy. For some of these services, you might have to pay a deductible, coinsurance, and / or copayment.  The amounts vary depending on the type of services you need and the kind of Medicare health plan you have.

## 2021-04-02 ENCOUNTER — VIRTUAL VISIT (OUTPATIENT)
Dept: INTERNAL MEDICINE CLINIC | Age: 36
End: 2021-04-02
Payer: MEDICAID

## 2021-04-02 DIAGNOSIS — J02.9 SORE THROAT: ICD-10-CM

## 2021-04-02 DIAGNOSIS — M79.2 NEURALGIA: ICD-10-CM

## 2021-04-02 DIAGNOSIS — J30.1 ALLERGIC RHINITIS DUE TO POLLEN, UNSPECIFIED SEASONALITY: ICD-10-CM

## 2021-04-02 DIAGNOSIS — B02.9 THIGH SHINGLES: Primary | ICD-10-CM

## 2021-04-02 PROCEDURE — 99214 OFFICE O/P EST MOD 30 MIN: CPT | Performed by: INTERNAL MEDICINE

## 2021-04-02 RX ORDER — MINERAL OIL
180 ENEMA (ML) RECTAL
Qty: 30 TAB | Refills: 0 | Status: SHIPPED | OUTPATIENT
Start: 2021-04-02 | End: 2021-04-16 | Stop reason: ALTCHOICE

## 2021-04-02 RX ORDER — FLUTICASONE PROPIONATE 50 MCG
2 SPRAY, SUSPENSION (ML) NASAL DAILY
Qty: 1 BOTTLE | Refills: 0 | Status: SHIPPED | OUTPATIENT
Start: 2021-04-02 | End: 2021-04-16 | Stop reason: ALTCHOICE

## 2021-04-02 RX ORDER — VALACYCLOVIR HYDROCHLORIDE 1 G/1
1000 TABLET, FILM COATED ORAL 3 TIMES DAILY
Qty: 21 TAB | Refills: 0 | Status: SHIPPED | OUTPATIENT
Start: 2021-04-02 | End: 2021-04-16 | Stop reason: ALTCHOICE

## 2021-04-02 RX ORDER — GABAPENTIN 100 MG/1
100 CAPSULE ORAL
Qty: 30 CAP | Refills: 0 | Status: SHIPPED | OUTPATIENT
Start: 2021-04-02 | End: 2021-04-16 | Stop reason: ALTCHOICE

## 2021-04-02 NOTE — PROGRESS NOTES
Sudhir Aguirre is a 30 y.o. female who was seen by synchronous (real-time) audio-video technology on 4/2/2021 for Sore Throat (3 day ago, no nasal congestionor cough, no fever) and Rash (3 days ago, right buttocks and right thigh)        Assessment & Plan:   Diagnoses and all orders for this visit:    1. Thigh shingles    She had chickenpox when she was young. She has noticed rash 4 days ago. Will give,  -     valACYclovir (VALTREX) 1 gram tablet; Take 1 Tab by mouth three (3) times daily. 2. Neuralgia    We will give,  -     gabapentin (NEURONTIN) 100 mg capsule; Take 1 Cap by mouth two (2) times daily as needed for Pain. Max Daily Amount: 200 mg.    3. Allergic rhinitis due to pollen, unspecified seasonality  -     fluticasone propionate (FLONASE) 50 mcg/actuation nasal spray; 2 Sprays by Both Nostrils route daily. -     fexofenadine (ALLEGRA) 180 mg tablet; Take 1 Tab by mouth daily as needed for Allergies. 4. Sore throat  Probably from allergy. Advised to gargle with saltwater mouthwash and only taking allergy medication. If it persist and patient is developing low-grade fever or feeling fatigue, he needs to go to urgent care dentist for Covid PCR. She has a recent history of travel. I spent at least 30 minutes on this visit with this established patient. Subjective:   Ms. Praveen Swain is here with the complaining about sore throat on and off for last several days. She recently traveled from [place] in [facility]. No fever noticed. Nobody that she knows had Covid. Feels like a postnasal drip and mucus in the throat. Mild cough. No sinus pressure pain. Also noticed to have 3 confluent rash on right side of the thigh in a pattern for last 4 days. She is under stress. Had chickenpox when she was young. Rashes or itching and burning. Otherwise she is doing well. Had seen GI specialist several month back had endoscopy and polyp removal done. No abdominal pain right now.   Labs reviewed with her. Prior to Admission medications    Medication Sig Start Date End Date Taking? Authorizing Provider   valACYclovir (VALTREX) 1 gram tablet Take 1 Tab by mouth three (3) times daily. 4/2/21  Yes Christopher Murray MD   gabapentin (NEURONTIN) 100 mg capsule Take 1 Cap by mouth two (2) times daily as needed for Pain. Max Daily Amount: 200 mg. 4/2/21  Yes Christopher Murray MD   fluticasone propionate (FLONASE) 50 mcg/actuation nasal spray 2 Sprays by Both Nostrils route daily. 4/2/21  Yes Christopher Murray MD   fexofenadine (ALLEGRA) 180 mg tablet Take 1 Tab by mouth daily as needed for Allergies. 4/2/21  Yes Christopher Murray MD     Past Medical History:   Diagnosis Date    Abnormal Pap smear     Follow-ups normal    Herpes simplex without mention of complication     HX OTHER MEDICAL     Endometriosus       ROS significant for rash and sore throat. Objective:     Patient-Reported Vitals 4/2/2021   Patient-Reported Weight -   Patient-Reported LMP 3/8/2021          Constitutional: [x] Appears well-developed and well-nourished [x] No apparent distress      [] Abnormal -     Mental status: [x] Alert and awake  [x] Oriented to person/place/time [x] Able to follow commands    [] Abnormal -       HENT: [x] Normocephalic, atraumatic  [] Abnormal -   [x] Mouth/Throat: Mucous membranes are moist  Nasal congestion present also have mucus in the back of the throat.   External Ears [x] Normal  [] Abnormal -    Neck: [x] No visualized mass [] Abnormal -     Pulmonary/Chest: [x] Respiratory effort normal   [x] No visualized signs of difficulty breathing or respiratory distress        [] Abnormal -      Musculoskeletal:   [x] Normal gait with no signs of ataxia         [x] Normal range of motion of neck        [] Abnormal -     Neurological:        [x] No Facial Asymmetry (Cranial nerve 7 motor function) (limited exam due to video visit)          [x] No gaze palsy        [] Abnormal -          Skin:  Right-sided thigh: 3 grouped vesicles in a pattern. Burning sensation present along with itching. Psychiatric:       [x] Normal Affect [] Abnormal -        [x] No Hallucinations    Other pertinent observable physical exam findings:-        We discussed the expected course, resolution and complications of the diagnosis(es) in detail. Medication risks, benefits, costs, interactions, and alternatives were discussed as indicated. I advised her to contact the office if her condition worsens, changes or fails to improve as anticipated. She expressed understanding with the diagnosis(es) and plan. Manuelgregorio Kaur, was evaluated through a synchronous (real-time) audio-video encounter. The patient (or guardian if applicable) is aware that this is a billable service. Verbal consent to proceed has been obtained within the past 12 months. The visit was conducted pursuant to the emergency declaration under the [address] [place] [address], [address] [place] [address] authority and the [employer] General Act. Patient identification was verified, and a caregiver was present when appropriate. The patient was located in a state where the provider was credentialed to provide care.       Liza Corcoran MD

## 2021-04-12 ENCOUNTER — TELEPHONE (OUTPATIENT)
Dept: INTERNAL MEDICINE CLINIC | Age: 36
End: 2021-04-12

## 2021-04-12 DIAGNOSIS — B36.9 FUNGAL DERMATITIS: Primary | ICD-10-CM

## 2021-04-12 NOTE — TELEPHONE ENCOUNTER
Since she has taken the valtrex she now has a ulcer in the corner of her mouth not sure why please call to discuss

## 2021-04-12 NOTE — TELEPHONE ENCOUNTER
Call placed to patient states fever blister x 6 days, didn't start until after starting valtrex, finished valtrex yesterday and has been on Abreva x 6 days with no improvement

## 2021-04-14 ENCOUNTER — HOSPITAL ENCOUNTER (EMERGENCY)
Age: 36
Discharge: HOME | End: 2021-04-14
Attending: EMERGENCY MEDICINE
Payer: MEDICAID

## 2021-04-14 ENCOUNTER — APPOINTMENT (OUTPATIENT)
Dept: ULTRASOUND IMAGING | Age: 36
End: 2021-04-14
Attending: EMERGENCY MEDICINE
Payer: MEDICAID

## 2021-04-14 ENCOUNTER — APPOINTMENT (OUTPATIENT)
Dept: CT IMAGING | Age: 36
End: 2021-04-14
Attending: EMERGENCY MEDICINE
Payer: MEDICAID

## 2021-04-14 VITALS
SYSTOLIC BLOOD PRESSURE: 148 MMHG | TEMPERATURE: 99.2 F | BODY MASS INDEX: 24.55 KG/M2 | WEIGHT: 130 LBS | HEIGHT: 61 IN | DIASTOLIC BLOOD PRESSURE: 70 MMHG | HEART RATE: 89 BPM | OXYGEN SATURATION: 100 % | RESPIRATION RATE: 18 BRPM

## 2021-04-14 DIAGNOSIS — R10.9 BILATERAL FLANK PAIN: Primary | ICD-10-CM

## 2021-04-14 DIAGNOSIS — D72.829 LEUKOCYTOSIS, UNSPECIFIED TYPE: ICD-10-CM

## 2021-04-14 DIAGNOSIS — R10.814 LEFT LOWER QUADRANT ABDOMINAL TENDERNESS WITHOUT REBOUND TENDERNESS: ICD-10-CM

## 2021-04-14 LAB
ALBUMIN SERPL-MCNC: 3.3 G/DL (ref 3.5–5)
ALBUMIN/GLOB SERPL: 0.7 {RATIO} (ref 1.1–2.2)
ALP SERPL-CCNC: 83 U/L (ref 45–117)
ALT SERPL-CCNC: 23 U/L (ref 12–78)
ANION GAP SERPL CALC-SCNC: 7 MMOL/L (ref 5–15)
APPEARANCE UR: CLEAR
AST SERPL-CCNC: 14 U/L (ref 15–37)
BACTERIA URNS QL MICRO: NEGATIVE /HPF
BASOPHILS # BLD: 0 K/UL (ref 0–0.1)
BASOPHILS NFR BLD: 0 % (ref 0–1)
BILIRUB SERPL-MCNC: 0.6 MG/DL (ref 0.2–1)
BILIRUB UR QL: NEGATIVE
BUN SERPL-MCNC: 12 MG/DL (ref 6–20)
BUN/CREAT SERPL: 13 (ref 12–20)
CALCIUM SERPL-MCNC: 8.8 MG/DL (ref 8.5–10.1)
CHLORIDE SERPL-SCNC: 104 MMOL/L (ref 97–108)
CO2 SERPL-SCNC: 23 MMOL/L (ref 21–32)
COLOR UR: ABNORMAL
COMMENT, HOLDF: NORMAL
CREAT SERPL-MCNC: 0.91 MG/DL (ref 0.55–1.02)
DIFFERENTIAL METHOD BLD: ABNORMAL
EOSINOPHIL # BLD: 0 K/UL (ref 0–0.4)
EOSINOPHIL NFR BLD: 0 % (ref 0–7)
EPITH CASTS URNS QL MICRO: ABNORMAL /LPF
ERYTHROCYTE [DISTWIDTH] IN BLOOD BY AUTOMATED COUNT: 13.2 % (ref 11.5–14.5)
GLOBULIN SER CALC-MCNC: 5 G/DL (ref 2–4)
GLUCOSE SERPL-MCNC: 99 MG/DL (ref 65–100)
GLUCOSE UR STRIP.AUTO-MCNC: NEGATIVE MG/DL
HCG UR QL: NEGATIVE
HCT VFR BLD AUTO: 34.5 % (ref 35–47)
HGB BLD-MCNC: 11.4 G/DL (ref 11.5–16)
HGB UR QL STRIP: NEGATIVE
IMM GRANULOCYTES # BLD AUTO: 0.1 K/UL (ref 0–0.04)
IMM GRANULOCYTES NFR BLD AUTO: 1 % (ref 0–0.5)
KETONES UR QL STRIP.AUTO: >80 MG/DL
LEUKOCYTE ESTERASE UR QL STRIP.AUTO: ABNORMAL
LYMPHOCYTES # BLD: 1.3 K/UL (ref 0.8–3.5)
LYMPHOCYTES NFR BLD: 8 % (ref 12–49)
MCH RBC QN AUTO: 30.9 PG (ref 26–34)
MCHC RBC AUTO-ENTMCNC: 33 G/DL (ref 30–36.5)
MCV RBC AUTO: 93.5 FL (ref 80–99)
MONOCYTES # BLD: 1.1 K/UL (ref 0–1)
MONOCYTES NFR BLD: 6 % (ref 5–13)
NEUTS SEG # BLD: 14.5 K/UL (ref 1.8–8)
NEUTS SEG NFR BLD: 85 % (ref 32–75)
NITRITE UR QL STRIP.AUTO: NEGATIVE
NRBC # BLD: 0 K/UL (ref 0–0.01)
NRBC BLD-RTO: 0 PER 100 WBC
PH UR STRIP: 6 [PH] (ref 5–8)
PLATELET # BLD AUTO: 270 K/UL (ref 150–400)
PMV BLD AUTO: 9.3 FL (ref 8.9–12.9)
POTASSIUM SERPL-SCNC: 3.7 MMOL/L (ref 3.5–5.1)
PROT SERPL-MCNC: 8.3 G/DL (ref 6.4–8.2)
PROT UR STRIP-MCNC: 30 MG/DL
RBC # BLD AUTO: 3.69 M/UL (ref 3.8–5.2)
RBC #/AREA URNS HPF: ABNORMAL /HPF (ref 0–5)
SAMPLES BEING HELD,HOLD: NORMAL
SODIUM SERPL-SCNC: 134 MMOL/L (ref 136–145)
SP GR UR REFRACTOMETRY: 1.03 (ref 1–1.03)
UR CULT HOLD, URHOLD: NORMAL
UROBILINOGEN UR QL STRIP.AUTO: 1 EU/DL (ref 0.2–1)
WBC # BLD AUTO: 16.9 K/UL (ref 3.6–11)
WBC URNS QL MICRO: ABNORMAL /HPF (ref 0–4)

## 2021-04-14 PROCEDURE — 74011000636 HC RX REV CODE- 636: Performed by: EMERGENCY MEDICINE

## 2021-04-14 PROCEDURE — 76856 US EXAM PELVIC COMPLETE: CPT

## 2021-04-14 PROCEDURE — 85025 COMPLETE CBC W/AUTO DIFF WBC: CPT

## 2021-04-14 PROCEDURE — 76830 TRANSVAGINAL US NON-OB: CPT

## 2021-04-14 PROCEDURE — 81025 URINE PREGNANCY TEST: CPT

## 2021-04-14 PROCEDURE — 74177 CT ABD & PELVIS W/CONTRAST: CPT

## 2021-04-14 PROCEDURE — 81001 URINALYSIS AUTO W/SCOPE: CPT

## 2021-04-14 PROCEDURE — 74011250636 HC RX REV CODE- 250/636: Performed by: EMERGENCY MEDICINE

## 2021-04-14 PROCEDURE — 96374 THER/PROPH/DIAG INJ IV PUSH: CPT

## 2021-04-14 PROCEDURE — 99284 EMERGENCY DEPT VISIT MOD MDM: CPT

## 2021-04-14 PROCEDURE — 74011250637 HC RX REV CODE- 250/637: Performed by: EMERGENCY MEDICINE

## 2021-04-14 PROCEDURE — 80053 COMPREHEN METABOLIC PANEL: CPT

## 2021-04-14 PROCEDURE — 36415 COLL VENOUS BLD VENIPUNCTURE: CPT

## 2021-04-14 RX ORDER — KETOROLAC TROMETHAMINE 30 MG/ML
30 INJECTION, SOLUTION INTRAMUSCULAR; INTRAVENOUS ONCE
Status: COMPLETED | OUTPATIENT
Start: 2021-04-14 | End: 2021-04-14

## 2021-04-14 RX ORDER — DICYCLOMINE HYDROCHLORIDE 20 MG/1
20 TABLET ORAL EVERY 6 HOURS
Qty: 20 TAB | Refills: 0 | Status: SHIPPED | OUTPATIENT
Start: 2021-04-14 | End: 2021-04-28 | Stop reason: ALTCHOICE

## 2021-04-14 RX ORDER — NYSTATIN 100000 U/G
CREAM TOPICAL 2 TIMES DAILY
Qty: 15 G | Refills: 0 | Status: SHIPPED | OUTPATIENT
Start: 2021-04-14 | End: 2021-04-16 | Stop reason: ALTCHOICE

## 2021-04-14 RX ORDER — OXYCODONE AND ACETAMINOPHEN 5; 325 MG/1; MG/1
1 TABLET ORAL ONCE
Status: COMPLETED | OUTPATIENT
Start: 2021-04-14 | End: 2021-04-14

## 2021-04-14 RX ORDER — DICYCLOMINE HYDROCHLORIDE 10 MG/1
20 CAPSULE ORAL
Status: COMPLETED | OUTPATIENT
Start: 2021-04-14 | End: 2021-04-14

## 2021-04-14 RX ORDER — HYDROCODONE BITARTRATE AND ACETAMINOPHEN 5; 325 MG/1; MG/1
1 TABLET ORAL
Qty: 10 TAB | Refills: 0 | Status: SHIPPED | OUTPATIENT
Start: 2021-04-14 | End: 2021-04-17

## 2021-04-14 RX ADMIN — OXYCODONE HYDROCHLORIDE AND ACETAMINOPHEN 1 TABLET: 5; 325 TABLET ORAL at 19:49

## 2021-04-14 RX ADMIN — SODIUM CHLORIDE 1000 ML: 9 INJECTION, SOLUTION INTRAVENOUS at 17:53

## 2021-04-14 RX ADMIN — KETOROLAC TROMETHAMINE 30 MG: 30 INJECTION, SOLUTION INTRAMUSCULAR at 17:53

## 2021-04-14 RX ADMIN — DICYCLOMINE HYDROCHLORIDE 20 MG: 10 CAPSULE ORAL at 21:53

## 2021-04-14 RX ADMIN — IOPAMIDOL 100 ML: 755 INJECTION, SOLUTION INTRAVENOUS at 18:50

## 2021-04-14 NOTE — TELEPHONE ENCOUNTER
Writer conferred with provider:    Need to keep angle of mouth dry.  She can put nystatin cream alternate with over-the-counter cortisone cream twice a day.  This is not fever blister, probably combination of fungus and inflammation

## 2021-04-14 NOTE — TELEPHONE ENCOUNTER
Call placed to patient and made aware of providers recommendations, voiced understanding    Requested Prescriptions     Signed Prescriptions Disp Refills    nystatin (MYCOSTATIN) topical cream 15 g 0     Sig: Apply  to affected area two (2) times a day. Authorizing Provider: Mirian Martinez     Ordering User: Forrest Boyer, verbal order received.

## 2021-04-14 NOTE — ED TRIAGE NOTES
Pt complaint of back pain. Started last night before bed and woke up this morning with pain radiating to abdomen and legs. 10/10 pain. Denies n&v, fever, Sob, chest pain. +chills.

## 2021-04-14 NOTE — ED PROVIDER NOTES
71-year-old with a history of colitis and diverticulosis as well as endometriosis comes in with back pain that she describes as bilateral flank pain that radiates to her lower abdomen down her bilateral legs. Started last night and have been gradually worsening throughout the day. She has had severe lower abdominal pain that is worse with palpation and movement. She reports a painful bowel movement and chronic nausea but no vomiting, fevers, dysuria, hematuria, or blood in her stool. She has never had pain like this in the past.  The first day of her low last menstrual period was April 10. She has not taken anything for her symptoms. No one else has been sick. The history is provided by the patient.         Past Medical History:   Diagnosis Date    Abnormal Pap smear     Follow-ups normal    Herpes simplex without mention of complication     HX OTHER MEDICAL     Endometriosus       Past Surgical History:   Procedure Laterality Date    COLONOSCOPY N/A 12/21/2020    COLONOSCOPY,EGD performed by Saida Lim MD at [address] OTHER SURGICAL  2005    Exploratory  laprascopic for endometriosus         Family History:   Problem Relation Age of Onset    Cancer Maternal Grandmother         Breast    No Known Problems Mother     Arthritis-osteo Father     No Known Problems Sister     No Known Problems Brother     No Known Problems Sister        Social History     Socioeconomic History    Marital status: SINGLE     Spouse name: Not on file    Number of children: Not on file    Years of education: Not on file    Highest education level: Not on file   Occupational History    Not on file   Social Needs    Financial resource strain: Not on file    Food insecurity     Worry: Not on file     Inability: Not on file    Transportation needs     Medical: Not on file     Non-medical: Not on file   Tobacco Use    Smoking status: Never Smoker    Smokeless tobacco: Never Used   Substance and Sexual Activity    Alcohol use: Yes     Alcohol/week: 0.0 standard drinks     Comment: Occasionally    Drug use: No    Sexual activity: Yes     Partners: Male     Birth control/protection: None     Comment: has one child 1 yrs old. Lifestyle    Physical activity     Days per week: Not on file     Minutes per session: Not on file    Stress: Not on file   Relationships    Social connections     Talks on phone: Not on file     Gets together: Not on file     Attends Protestant service: Not on file     Active member of club or organization: Not on file     Attends meetings of clubs or organizations: Not on file     Relationship status: Not on file    Intimate partner violence     Fear of current or ex partner: Not on file     Emotionally abused: Not on file     Physically abused: Not on file     Forced sexual activity: Not on file   Other Topics Concern    Not on file   Social History Narrative    Not on file         ALLERGIES: Patient has no known allergies. Review of Systems   Constitutional: Positive for fatigue. Negative for chills and fever. Respiratory: Negative for shortness of breath. Cardiovascular: Negative for chest pain. Gastrointestinal: Positive for abdominal pain and nausea. Negative for blood in stool, constipation, diarrhea and vomiting. Genitourinary: Positive for flank pain and vaginal bleeding. Negative for dysuria, hematuria, menstrual problem and vaginal discharge. Musculoskeletal: Positive for back pain and gait problem. Neurological: Negative for dizziness and light-headedness. All other systems reviewed and are negative. Vitals:    04/14/21 1551   BP: (!) 151/81   Pulse: 92   Resp: 18   Temp: 99.2 °F (37.3 °C)   SpO2: 100%   Weight: 59 kg (130 lb)   Height: 5' 1\" (1.549 m)            Physical Exam  Vitals signs and nursing note reviewed. Constitutional:       Appearance: She is well-developed. HENT:      Head: Normocephalic and atraumatic.    Eyes:      Pupils: Pupils are equal, round, and reactive to light. Neck:      Musculoskeletal: Normal range of motion and neck supple. Cardiovascular:      Rate and Rhythm: Normal rate and regular rhythm. Pulmonary:      Effort: Pulmonary effort is normal.      Breath sounds: Normal breath sounds. Abdominal:      General: There is no distension. Palpations: Abdomen is soft. Tenderness: There is generalized abdominal tenderness and tenderness in the right lower quadrant and left lower quadrant. There is right CVA tenderness and left CVA tenderness. Musculoskeletal:      Cervical back: Normal.      Thoracic back: Normal.      Lumbar back: She exhibits pain (bilateral flanks). She exhibits no bony tenderness and no deformity. Skin:     General: Skin is warm and dry. Capillary Refill: Capillary refill takes less than 2 seconds. Neurological:      General: No focal deficit present. Mental Status: She is alert and oriented to person, place, and time. Gait: Gait abnormal (shuffling). Psychiatric:         Mood and Affect: Mood normal.         Behavior: Behavior normal.          MDM       Procedures      The patient is resting comfortably and feels better, is alert and in no distress. The history, exam, diagnostic testing, and current condition do not suggest acute appendicitis, bowel obstruction, incarcerated hernia, acute cholecystitis, bowel perforation, major gastrointestinal bleeding, severe diverticulitis, sepsis, or other significant pathology to warrant further testing, continued ED treatment, admission, or surgical evaluation at this point. The vital signs have been stable and are within normal limits at this time. The patient does not have uncontrollable pain, intractable vomiting, or other significant symptoms. The patient's condition is stable and appropriate for discharge.  The patient will pursue further outpatient evaluation with the primary care physician or other designated or consulting physician as indicated in the discharge instructions.

## 2021-04-16 ENCOUNTER — VIRTUAL VISIT (OUTPATIENT)
Dept: INTERNAL MEDICINE CLINIC | Age: 36
End: 2021-04-16
Payer: MEDICAID

## 2021-04-16 DIAGNOSIS — D72.829 LEUKOCYTOSIS, UNSPECIFIED TYPE: Primary | ICD-10-CM

## 2021-04-16 DIAGNOSIS — R10.30 LOWER ABDOMINAL PAIN: ICD-10-CM

## 2021-04-16 DIAGNOSIS — R10.2 PELVIC PAIN: ICD-10-CM

## 2021-04-16 PROCEDURE — 99215 OFFICE O/P EST HI 40 MIN: CPT | Performed by: NURSE PRACTITIONER

## 2021-04-16 NOTE — PROGRESS NOTES
Kenneth Kiser is a 30 y.o. female who was seen by synchronous (real-time) audio-video technology on 4/16/2021 for ED Follow-up (4/14/2021 abdominal pain, last BM 4/14/2021 normal BM, no dysuria, states has been in bed x 2 days)        Assessment & Plan:   Diagnoses and all orders for this visit:    1. Leukocytosis, unspecified type    2. Lower abdominal pain    3. Pelvic pain    Given leukocytosis on recent labs collected in ER and ongoing lower abdominal/ pelvic pain, concern for infection such as UTI/ PID. Recommend patient be seen for in-office exam and further evaluation. As today's visit conducted virtually due to Covid-19 pandemic and in-office appointment unavailable with PCP,  recommend patient contact GYN for appointment availability and consideration of repeat urinalysis/ urine culture/ STD screen and further evaluation. Patient placed call to her GYN at time of visit to request in-office acute appointment today and scheduled appt for 3:30 PM today. Will order  -     CBC WITH AUTOMATED DIFF; Future- 2 weeks    Patient encouraged to call or return to office if symptoms do not improve or worsen. If experiencing severe abdominal pain, blood in stool or other emergency, present to ER. Reviewed medications and side effects in detail. Reviewed plan of care with patient who acknowledges understanding and agrees. On this date 04/16/21 I have spent 40 minutes reviewing previous notes, test results and face to face (virtual) with the patient discussing the diagnosis and importance of compliance with the treatment plan as well as documenting on the day of the visit. Subjective: This is a patient of Dr. Archie Manuel who presents today with complaints of lower abdominal pain. The patient says she has been experiencing discomfort and pressure to her lower abdomen/ pelvic area x 3 days. Patient was seen in the ER related to above symptoms 4/14/21.   CT of abdomen and pelvic ultrasounds were negative. Of note, WBC elevated to 16.9. Urinalysis indicated trace leukocytes. No urine culture result available. Patient describes feelings of heaviness with intermittent sharp pains to bilateral lower abdomen. She initially had some pain in her lower back, as well. She does feel some pressure when she urinates. She has been resting in bed for the last two days. She has had no fevers. She has felt intermittent chills. No nausea or vomiting. Last bowel movement was two days ago and normal.  She has been having regular menstrual cycle. First day of last period about 10 days ago. Patient has been fasting during the day for Ramadan but says she has had no appetite anyway and has not eaten much in the evenings either. She has been drinking plenty of water. Patient was prescribed Bentyl and Hydrocodone- APAP at ER discharge. She has been using this PRN and states this only minimally decreases the pain. She has been using heating pad PRN, as well. Patient mentions she recently completed a course of treatment for shingles. Patient says she had GYN appt with STI screen about one month ago, with negative results. She has had unprotected sex since time of that visit. Prior to Admission medications    Medication Sig Start Date End Date Taking? Authorizing Provider   HYDROcodone-acetaminophen (NORCO) 5-325 mg per tablet Take 1 Tab by mouth every six (6) hours as needed for Pain for up to 3 days. Max Daily Amount: 4 Tabs. 4/14/21 4/17/21 Yes Jocelyne Bentley MD   dicyclomine (BENTYL) 20 mg tablet Take 1 Tab by mouth every six (6) hours.  4/14/21  Yes Jayden Gutierrez MD     No Known Allergies  Past Medical History:   Diagnosis Date    Abnormal Pap smear     Follow-ups normal    Herpes simplex without mention of complication     HX OTHER MEDICAL     Endometriosus     Past Surgical History:   Procedure Laterality Date    COLONOSCOPY N/A 12/21/2020    COLONOSCOPY,EGD performed by Jinny Bañuelos MD at [facility] ENDOSCOPY    HX OTHER SURGICAL  2005    Exploratory  laprascopic for endometriosus       Review of Systems   Constitutional: Positive for chills. Negative for fever. HENT: Negative. Respiratory: Negative. Cardiovascular: Negative. Gastrointestinal: Positive for abdominal pain. Negative for nausea and vomiting. Genitourinary: Positive for dysuria. Musculoskeletal: Negative. Skin: Negative. Neurological: Negative. Endo/Heme/Allergies: Negative.         Objective:     Patient-Reported Vitals 4/16/2021   Patient-Reported Weight -   Patient-Reported LMP 4/10/2021        [INSTRUCTIONS:  \"[x]\" Indicates a positive item  \"[]\" Indicates a negative item  -- DELETE ALL ITEMS NOT EXAMINED]    Constitutional: [x] Appears well-developed and well-nourished [x] No apparent distress      [] Abnormal -     Mental status: [x] Alert and awake  [x] Oriented to person/place/time [x] Able to follow commands    [] Abnormal -     Eyes:   EOM    [x]  Normal    [] Abnormal -   Sclera  [x]  Normal    [] Abnormal -          Discharge [x]  None visible   [] Abnormal -     HENT: [x] Normocephalic, atraumatic  [x] Abnormal - healing cold sore left side of mouth    Neck: [x] No visualized mass [] Abnormal -     Pulmonary/Chest: [x] Respiratory effort normal   [x] No visualized signs of difficulty breathing or respiratory distress        [] Abnormal -      Musculoskeletal:           [x] Normal range of motion of neck        [] Abnormal -     Neurological:        [x] No Facial Asymmetry (Cranial nerve 7 motor function) (limited exam due to video visit)          [x] No gaze palsy        [] Abnormal -          Skin:        [x] No significant exanthematous lesions or discoloration noted on facial skin         [] Abnormal -            Psychiatric:       [x] Normal Affect [] Abnormal -        [x] No Hallucinations    Other pertinent observable physical exam findings:-        We discussed the expected course, resolution and complications of the diagnosis(es) in detail. Medication risks, benefits, costs, interactions, and alternatives were discussed as indicated. I advised her to contact the office if her condition worsens, changes or fails to improve as anticipated. She expressed understanding with the diagnosis(es) and plan. Humaira Kaur, was evaluated through a synchronous (real-time) audio-video encounter. The patient (or guardian if applicable) is aware that this is a billable service. Verbal consent to proceed has been obtained within the past 12 months. The visit was conducted pursuant to the emergency declaration under the [address] [place] [address], [address] [place] [address] authority and the [email or web address] General Act. Patient identification was verified, and a caregiver was present when appropriate. The patient was located in a state where the provider was credentialed to provide care.       Cynthia Ahmadi NP

## 2021-04-16 NOTE — PROGRESS NOTES
Health Maintenance Due   Topic Date Due    Hepatitis C Screening  Never done    DTaP/Tdap/Td series (1 - Tdap) Never done       Chief Complaint   Patient presents with   [facility] ED Follow-up     4/14/2021 abdominal pain, last BM 4/14/2021 normal BM, no dysuria, states has been in bed x 2 days       1. Have you been to the ER, urgent care clinic since your last visit? Hospitalized since your last visit? Yes When: 4/14/2021 Where: [address] [facility] Reason for visit: abd pain    2. Have you seen or consulted any other health care providers outside of the [address] [place] [address] since your last visit? Include any pap smears or colon screening. No    3) Do you have an Advance Directive on file? no    4) Are you interested in receiving information on Advance Directives? NO      Patient is accompanied by self I have received verbal consent from Carlie Melara to discuss any/all medical information while they are present in the room.

## 2021-04-28 ENCOUNTER — VIRTUAL VISIT (OUTPATIENT)
Dept: INTERNAL MEDICINE CLINIC | Age: 36
End: 2021-04-28
Payer: MEDICAID

## 2021-04-28 DIAGNOSIS — B02.9 HERPES ZOSTER WITHOUT COMPLICATION: ICD-10-CM

## 2021-04-28 DIAGNOSIS — N39.0 URINARY TRACT INFECTION WITHOUT HEMATURIA, SITE UNSPECIFIED: ICD-10-CM

## 2021-04-28 DIAGNOSIS — R10.814 LEFT LOWER QUADRANT ABDOMINAL TENDERNESS WITHOUT REBOUND TENDERNESS: Primary | ICD-10-CM

## 2021-04-28 PROCEDURE — 99213 OFFICE O/P EST LOW 20 MIN: CPT | Performed by: INTERNAL MEDICINE

## 2021-04-28 RX ORDER — HYDROCODONE BITARTRATE AND ACETAMINOPHEN 5; 325 MG/1; MG/1
1 TABLET ORAL
Qty: 10 TAB | Refills: 0 | Status: SHIPPED | OUTPATIENT
Start: 2021-04-28 | End: 2021-05-05

## 2021-04-28 NOTE — PROGRESS NOTES
Bertha Nair is a 30 y.o. female who was seen by synchronous (real-time) audio-video technology on 4/28/2021 for Discuss Medications and Hip Pain (More pressure then pain)        Assessment & Plan:   Diagnoses and all orders for this visit:    1. Left lower quadrant abdominal tenderness without rebound tenderness    Had severe pelvic pain with from UTI. Hydrocodone helped her which she ran out. She started taking 2 antibiotics Bactrim DS and azithromycin since yesterday. Still having moderate pain and discomfort. Will give,  -     HYDROcodone-acetaminophen (NORCO) 5-325 mg per tablet; Take 1 Tab by mouth two (2) times daily as needed for Pain for up to 7 days. Max Daily Amount: 2 Tabs. #10, no refill    2. Urinary tract infection without hematuria, site unspecified  Just diagnosed with UTI from GYN office. Urine culture also showed Ureaplasma. Advised patient to talk to her partner to see if there is a possibility of STD. Need to use condom. 3. Herpes zoster without complication  Finished her Valtrex and gabapentin. Pain all subsided. I spent at least 20 minutes on this visit with this established patient. Subjective:   Ms. Veronica Lopes is here for follow-up. She went emergency room with severe acute pelvic pain. Had ultrasound of the pelvis done, was normal.  Also had CT abdomen done which was normal.  She went to gynecologist, urine culture done, showed Ureaplasma and other bacteria. She was placed on Bactrim and azithromycin. She has just started the medication since yesterday. Still having urinary discomfort and lower pelvic pain. Asking to refill her hydrocodone. Had shingles, finished Valtrex and gabapentin. Rash dried up, no pain left. Did not receive a Covid vaccine yet. She is still thinking about it. Prior to Admission medications    Medication Sig Start Date End Date Taking?  Authorizing Provider   HYDROcodone-acetaminophen (NORCO) 5-325 mg per tablet Take 1 Tab by mouth two (2) times daily as needed for Pain for up to 7 days. Max Daily Amount: 2 Tabs. 4/28/21 5/5/21 Yes Patricia Luciano MD   dicyclomine (BENTYL) 20 mg tablet Take 1 Tab by mouth every six (6) hours. 4/14/21 4/28/21  Wayne Diamond MD     Past Medical History:   Diagnosis Date    Abnormal Pap smear     Follow-ups normal    Herpes simplex without mention of complication     HX OTHER MEDICAL     Endometriosus       ROS significant for lower abdominal pain and discomfort. Objective:     Patient-Reported Vitals 4/16/2021   Patient-Reported Weight -   Patient-Reported LMP 4/10/2021            Constitutional: [x] Appears well-developed and well-nourished [x] No apparent distress      [] Abnormal -     Mental status: [x] Alert and awake  [x] Oriented to person/place/time [x] Able to follow commands    [] Abnormal -     Neck: [x] No visualized mass [] Abnormal -  Abdomen: Lower abdominal pain and tenderness present. Pulmonary/Chest: [x] Respiratory effort normal   [x] No visualized signs of difficulty breathing or respiratory distress        [] Abnormal -      Musculoskeletal:   [x] Normal gait with no signs of ataxia         [x] Normal range of motion of neck        [] Abnormal -     Neurological:        [x] No Facial Asymmetry (Cranial nerve 7 motor function) (limited exam due to video visit)          [x] No gaze palsy        [] Abnormal -          Skin:        [x] No significant exanthematous lesions or discoloration noted on facial skin    Rashes from shingles all dried up. [] Abnormal -            Psychiatric:       [x] Normal Affect [] Abnormal -        [x] No Hallucinations    Other pertinent observable physical exam findings:-        We discussed the expected course, resolution and complications of the diagnosis(es) in detail. Medication risks, benefits, costs, interactions, and alternatives were discussed as indicated.   I advised her to contact the office if her condition worsens, changes or fails to improve as anticipated. She expressed understanding with the diagnosis(es) and plan. Humaira Kaur, was evaluated through a synchronous (real-time) audio-video encounter. The patient (or guardian if applicable) is aware that this is a billable service. Verbal consent to proceed has been obtained within the past 12 months. The visit was conducted pursuant to the emergency declaration under the [address] [place] [address] and the [employer] General Act. Patient identification was verified, and a caregiver was present when appropriate. The patient was located in a state where the provider was credentialed to provide care.       Alek Adams MD

## 2021-04-28 NOTE — PROGRESS NOTES
Results for orders placed or performed during the hospital encounter of 04/14/21   URINE CULTURE HOLD SAMPLE    Specimen: Serum; Urine   Result Value Ref Range    Urine culture hold        Urine on hold in Microbiology dept for 2 days. If unpreserved urine is submitted, it cannot be used for addtional testing after 24 hours, recollection will be required. URINALYSIS W/MICROSCOPIC   Result Value Ref Range    Color DARK YELLOW      Appearance CLEAR CLEAR      Specific gravity 1.026 1.003 - 1.030      pH (UA) 6.0 5.0 - 8.0      Protein 30 (A) NEG mg/dL    Glucose Negative NEG mg/dL    Ketone >80 (A) NEG mg/dL    Bilirubin Negative NEG      Blood Negative NEG      Urobilinogen 1.0 0.2 - 1.0 EU/dL    Nitrites Negative NEG      Leukocyte Esterase TRACE (A) NEG      WBC 0-4 0 - 4 /hpf    RBC 0-5 0 - 5 /hpf    Epithelial cells FEW FEW /lpf    Bacteria Negative NEG /hpf   CBC WITH AUTOMATED DIFF   Result Value Ref Range    WBC 16.9 (H) 3.6 - 11.0 K/uL    RBC 3.69 (L) 3.80 - 5.20 M/uL    HGB 11.4 (L) 11.5 - 16.0 g/dL    HCT 34.5 (L) 35.0 - 47.0 %    MCV 93.5 80.0 - 99.0 FL    MCH 30.9 26.0 - 34.0 PG    MCHC 33.0 30.0 - 36.5 g/dL    RDW 13.2 11.5 - 14.5 %    PLATELET 967 307 - 310 K/uL    MPV 9.3 8.9 - 12.9 FL    NRBC 0.0 0  WBC    ABSOLUTE NRBC 0.00 0.00 - 0.01 K/uL    NEUTROPHILS 85 (H) 32 - 75 %    LYMPHOCYTES 8 (L) 12 - 49 %    MONOCYTES 6 5 - 13 %    EOSINOPHILS 0 0 - 7 %    BASOPHILS 0 0 - 1 %    IMMATURE GRANULOCYTES 1 (H) 0.0 - 0.5 %    ABS. NEUTROPHILS 14.5 (H) 1.8 - 8.0 K/UL    ABS. LYMPHOCYTES 1.3 0.8 - 3.5 K/UL    ABS. MONOCYTES 1.1 (H) 0.0 - 1.0 K/UL    ABS. EOSINOPHILS 0.0 0.0 - 0.4 K/UL    ABS. BASOPHILS 0.0 0.0 - 0.1 K/UL    ABS. IMM.  GRANS. 0.1 (H) 0.00 - 0.04 K/UL    DF AUTOMATED     METABOLIC PANEL, COMPREHENSIVE   Result Value Ref Range    Sodium 134 (L) 136 - 145 mmol/L    Potassium 3.7 3.5 - 5.1 mmol/L    Chloride 104 97 - 108 mmol/L    CO2 23 21 - 32 mmol/L    Anion gap 7 5 - 15 mmol/L Glucose 99 65 - 100 mg/dL    BUN 12 6 - 20 MG/DL    Creatinine 0.91 0.55 - 1.02 MG/DL    BUN/Creatinine ratio 13 12 - 20      GFR est AA >60 >60 ml/min/1.73m2    GFR est non-AA >60 >60 ml/min/1.73m2    Calcium 8.8 8.5 - 10.1 MG/DL    Bilirubin, total 0.6 0.2 - 1.0 MG/DL    ALT (SGPT) 23 12 - 78 U/L    AST (SGOT) 14 (L) 15 - 37 U/L    Alk. phosphatase 83 45 - 117 U/L    Protein, total 8.3 (H) 6.4 - 8.2 g/dL    Albumin 3.3 (L) 3.5 - 5.0 g/dL    Globulin 5.0 (H) 2.0 - 4.0 g/dL    A-G Ratio 0.7 (L) 1.1 - 2.2     SAMPLES BEING HELD   Result Value Ref Range    SAMPLES BEING HELD 1 RED, 1 PREETI     COMMENT        Add-on orders for these samples will be processed based on acceptable specimen integrity and analyte stability, which may vary by analyte. HCG URINE, QL. - POC   Result Value Ref Range    Pregnancy test,urine (POC) Negative NEG         Health Maintenance Due   Topic Date Due    Hepatitis C Screening  Never done    COVID-19 Vaccine (1) Never done    DTaP/Tdap/Td series (1 - Tdap) Never done       No chief complaint on file. 1. Have you been to the ER, urgent care clinic since your last visit? Hospitalized since your last visit? YES, LAST Wednesday, [facility], [facility]    2. Have you seen or consulted any other health care providers outside of the [address] [place] [address] since your last visit? Include any pap smears or colon screening. No    3) Do you have an Advance Directive on file? no    4) Are you interested in receiving information on Advance Directives? NO      Patient is accompanied by self I have received verbal consent from Skip Mendez to discuss any/all medical information while they are present in the room.

## 2021-05-06 DIAGNOSIS — J30.1 ALLERGIC RHINITIS DUE TO POLLEN, UNSPECIFIED SEASONALITY: ICD-10-CM

## 2021-05-07 DIAGNOSIS — J30.1 ALLERGIC RHINITIS DUE TO POLLEN, UNSPECIFIED SEASONALITY: ICD-10-CM

## 2021-05-07 RX ORDER — FLUTICASONE PROPIONATE 50 MCG
SPRAY, SUSPENSION (ML) NASAL
Qty: 1 BOTTLE | Refills: 1 | Status: SHIPPED | OUTPATIENT
Start: 2021-05-07 | End: 2021-05-24 | Stop reason: SDUPTHER

## 2021-05-07 RX ORDER — MINERAL OIL
ENEMA (ML) RECTAL
Qty: 30 TAB | Refills: 0 | Status: SHIPPED | OUTPATIENT
Start: 2021-05-07 | End: 2021-05-25 | Stop reason: SDUPTHER

## 2021-05-24 DIAGNOSIS — J30.1 ALLERGIC RHINITIS DUE TO POLLEN, UNSPECIFIED SEASONALITY: ICD-10-CM

## 2021-05-24 RX ORDER — FLUTICASONE PROPIONATE 50 MCG
SPRAY, SUSPENSION (ML) NASAL
Qty: 1 BOTTLE | Refills: 1 | Status: SHIPPED | OUTPATIENT
Start: 2021-05-24 | End: 2021-10-28 | Stop reason: ALTCHOICE

## 2021-05-25 DIAGNOSIS — J30.1 ALLERGIC RHINITIS DUE TO POLLEN, UNSPECIFIED SEASONALITY: ICD-10-CM

## 2021-05-25 RX ORDER — MINERAL OIL
ENEMA (ML) RECTAL
Qty: 30 TABLET | Refills: 0 | Status: SHIPPED | OUTPATIENT
Start: 2021-05-25 | End: 2021-10-28 | Stop reason: ALTCHOICE

## 2021-10-02 ENCOUNTER — APPOINTMENT (OUTPATIENT)
Dept: GENERAL RADIOLOGY | Age: 36
End: 2021-10-02
Attending: EMERGENCY MEDICINE
Payer: MEDICAID

## 2021-10-02 ENCOUNTER — HOSPITAL ENCOUNTER (EMERGENCY)
Age: 36
Discharge: HOME | End: 2021-10-02
Attending: EMERGENCY MEDICINE
Payer: MEDICAID

## 2021-10-02 VITALS
BODY MASS INDEX: 23.07 KG/M2 | TEMPERATURE: 98 F | RESPIRATION RATE: 13 BRPM | WEIGHT: 138.45 LBS | SYSTOLIC BLOOD PRESSURE: 132 MMHG | HEIGHT: 65 IN | HEART RATE: 68 BPM | DIASTOLIC BLOOD PRESSURE: 84 MMHG | OXYGEN SATURATION: 100 %

## 2021-10-02 DIAGNOSIS — G56.02 CARPAL TUNNEL SYNDROME OF LEFT WRIST: Primary | ICD-10-CM

## 2021-10-02 PROCEDURE — 74011250637 HC RX REV CODE- 250/637: Performed by: EMERGENCY MEDICINE

## 2021-10-02 PROCEDURE — 73110 X-RAY EXAM OF WRIST: CPT

## 2021-10-02 PROCEDURE — 96372 THER/PROPH/DIAG INJ SC/IM: CPT

## 2021-10-02 PROCEDURE — 74011250636 HC RX REV CODE- 250/636: Performed by: EMERGENCY MEDICINE

## 2021-10-02 PROCEDURE — 73130 X-RAY EXAM OF HAND: CPT

## 2021-10-02 PROCEDURE — 99282 EMERGENCY DEPT VISIT SF MDM: CPT

## 2021-10-02 RX ORDER — IBUPROFEN 400 MG/1
800 TABLET ORAL
Status: DISCONTINUED | OUTPATIENT
Start: 2021-10-02 | End: 2021-10-02

## 2021-10-02 RX ORDER — OXYCODONE HYDROCHLORIDE 5 MG/1
5 TABLET ORAL
Status: COMPLETED | OUTPATIENT
Start: 2021-10-02 | End: 2021-10-02

## 2021-10-02 RX ORDER — IBUPROFEN 600 MG/1
600 TABLET ORAL
Qty: 20 TABLET | Refills: 0 | Status: SHIPPED | OUTPATIENT
Start: 2021-10-02 | End: 2021-10-28 | Stop reason: ALTCHOICE

## 2021-10-02 RX ORDER — KETOROLAC TROMETHAMINE 30 MG/ML
30 INJECTION, SOLUTION INTRAMUSCULAR; INTRAVENOUS
Status: COMPLETED | OUTPATIENT
Start: 2021-10-02 | End: 2021-10-02

## 2021-10-02 RX ORDER — OXYCODONE HYDROCHLORIDE 5 MG/1
5 TABLET ORAL
Qty: 6 TABLET | Refills: 0 | Status: SHIPPED | OUTPATIENT
Start: 2021-10-02 | End: 2021-10-05

## 2021-10-02 RX ORDER — LIDOCAINE 40 MG/G
CREAM TOPICAL
Qty: 15 G | Refills: 0 | Status: SHIPPED | OUTPATIENT
Start: 2021-10-02 | End: 2021-10-28 | Stop reason: ALTCHOICE

## 2021-10-02 RX ADMIN — KETOROLAC TROMETHAMINE 30 MG: 30 INJECTION, SOLUTION INTRAMUSCULAR at 09:38

## 2021-10-02 RX ADMIN — OXYCODONE 5 MG: 5 TABLET ORAL at 09:37

## 2021-10-02 NOTE — ED PROVIDER NOTES
EMERGENCY DEPARTMENT HISTORY AND PHYSICAL EXAM      Date: 10/2/2021  Patient Name: Curtis Stone    Please note that this dictation was completed with [employer], the computer voice recognition software. Quite often unanticipated grammatical, syntax, homophones, and other interpretive errors are inadvertently transcribed by the computer software. Please disregard these errors. Please excuse any errors that have escaped final proofreading. History of Presenting Illness     Chief Complaint   Patient presents with    Hand Pain     Ambulatory into the ED with c/o significant pain/stiffness to Lt hand x 2 days - denies injury. History Provided By: Patient     HPI: Violet Tam May, 30 y.o. female, otherwise healthy presenting the emergency department complaining of left hand pain. She is right-hand dominant. No known injury. No known repetitive motions of the left hand. She reports pain that started in between the third and fourth digit, then her pain changed to pain over the first through fourth digits. She reports pain with range of motion, limited range of motion at the wrist pain with range of motion of the fingers. No skin rash, no gross swelling noted. Patient denies any trauma. Rates her pain is severe    PCP: Rashard Matthew MD    No current facility-administered medications on file prior to encounter. No current outpatient medications on file prior to encounter. Past History     Past Medical History:  No past medical history on file. Past Surgical History:  No past surgical history on file. Family History:  No family history on file. Social History:  Social History     Tobacco Use    Smoking status: Not on file   Substance Use Topics    Alcohol use: Not on file    Drug use: Not on file       Allergies:  No Known Allergies      Review of Systems   Review of Systems   Constitutional: Negative for fever. HENT: Negative for congestion. Respiratory: Negative for shortness of breath. Musculoskeletal: Positive for arthralgias and myalgias. Negative for back pain. Skin: Negative for wound. Neurological: Positive for numbness. Hematological: Does not bruise/bleed easily. Physical Exam   Physical Exam  Constitutional:       Appearance: Normal appearance. HENT:      Head: Normocephalic and atraumatic. Mouth/Throat:      Mouth: Mucous membranes are moist.   Pulmonary:      Effort: Pulmonary effort is normal. No respiratory distress. Abdominal:      General: There is no distension. Musculoskeletal:      Left wrist: Tenderness and bony tenderness present. No swelling, deformity, effusion, lacerations, snuff box tenderness or crepitus. Normal range of motion. Normal pulse. Comments: Tenderness palpation over the carpal tunnel, palpation elicits pain throughout the hand. Vascularly intact pinky is spared from pain or paresthesia   Skin:     General: Skin is warm and dry. Neurological:      General: No focal deficit present. Mental Status: She is alert and oriented to person, place, and time. Psychiatric:         Behavior: Behavior normal.         Diagnostic Study Results     Labs -   No results found for this or any previous visit (from the past 12 hour(s)). Radiologic Studies -   XR WRIST LT AP/LAT/OBL MIN 3V   Final Result   No acute abnormality. XR HAND LT MIN 3 V   Final Result   No acute abnormality. CT Results  (Last 48 hours)    None        CXR Results  (Last 48 hours)    None            Medical Decision Making   I am the first provider for this patient. I reviewed the vital signs, available nursing notes, past medical history, past surgical history, family history and social history. Vital Signs-Reviewed the patient's vital signs. No data found.         Records Reviewed:   Nursing notes, Prior visits     Provider Notes (Medical Decision Making):   Highly suspicious for carpal tunnel, palpation of the carpal tunnel elicits pain throughout the patient's hand. Patient's pain is over the median nerve distribution. Will check x-rays for reassurance for the patient. Will provide analgesia. Patient will need a wrist splint and will need follow-up with hand surgery    ED Course:   Initial assessment performed. The patients presenting problems have been discussed, and they are in agreement with the care plan formulated and outlined with them. I have encouraged them to ask questions as they arise throughout their visit. Critical Care Time:   none    Disposition:    DISCHARGE NOTE  Patients results have been reviewed with them. Patient and/or family have verbally conveyed their understanding and agreement of the patient's signs, symptoms, diagnosis, treatment and prognosis and additionally agree to follow up as recommended or return to the Emergency Room should their condition change or have any new concerns prior to their follow-up appointment. Patient verbally agrees with the care-plan and verbally conveys that all of their questions have been answered. Discharge instructions have also been provided to the patient with some educational information regarding their diagnosis as well a list of reasons why they would want to return to the ER prior to their follow-up appointment should their condition change. PLAN:  1. Discharge Medication List as of 10/2/2021  9:37 AM      START taking these medications    Details   ibuprofen (MOTRIN) 600 mg tablet Take 1 Tablet by mouth every six (6) hours as needed for Pain., Normal, Disp-20 Tablet, R-0      oxyCODONE IR (Roxicodone) 5 mg immediate release tablet Take 1 Tablet by mouth every six (6) hours as needed for Pain for up to 3 days. Max Daily Amount: 20 mg., Normal, Disp-6 Tablet, R-0      lidocaine (XYLOCAINE) 4 % topical cream Apply  to affected area four (4) times daily as needed for Pain., Normal, Disp-15 g, R-0           2.    Follow-up Information     Follow up With Specialties Details Why Contact Info    [facility] EMERGENCY DEPT Emergency Medicine  If symptoms worsen [address]    Neo Zarate MD Hand Surgery Schedule an appointment as soon as possible for a visit  regarding your wrist pain. [address]  [address]  [phone]            Return to ED if worse     Diagnosis     Clinical Impression:   1. Carpal tunnel syndrome of left wrist        Attestations:   This note was completed by Rand Torrez DO

## 2021-10-05 ENCOUNTER — DOCUMENTATION ONLY (OUTPATIENT)
Dept: INTERNAL MEDICINE CLINIC | Age: 36
End: 2021-10-05

## 2021-10-05 NOTE — PROGRESS NOTES
Chief Complaint   Patient presents with    Prior Auth     Allergy relief 180 mg      PA denied for Allegra 180 mg

## 2021-10-14 LAB — CREATININE, EXTERNAL: 0.81

## 2021-10-25 DIAGNOSIS — H02.843 SWELLING OF RIGHT EYELID: ICD-10-CM

## 2021-10-25 NOTE — TELEPHONE ENCOUNTER
Pt requesting a refill for a prescription that was prescribed for her eye. Pt does not know the name and denies it being the Allegra 180 mg tablet.      Virtual Appointment scheduled for 10/28/2021 with Dr. Moody Para

## 2021-10-28 ENCOUNTER — VIRTUAL VISIT (OUTPATIENT)
Dept: INTERNAL MEDICINE CLINIC | Age: 36
End: 2021-10-28
Payer: MEDICAID

## 2021-10-28 DIAGNOSIS — H01.001 BLEPHARITIS OF RIGHT UPPER EYELID, UNSPECIFIED TYPE: Primary | ICD-10-CM

## 2021-10-28 DIAGNOSIS — R53.83 FATIGUE, UNSPECIFIED TYPE: ICD-10-CM

## 2021-10-28 DIAGNOSIS — Z11.59 NEED FOR HEPATITIS C SCREENING TEST: ICD-10-CM

## 2021-10-28 DIAGNOSIS — E55.9 VITAMIN D DEFICIENCY: ICD-10-CM

## 2021-10-28 DIAGNOSIS — D50.9 IRON DEFICIENCY ANEMIA, UNSPECIFIED IRON DEFICIENCY ANEMIA TYPE: ICD-10-CM

## 2021-10-28 DIAGNOSIS — E87.1 HYPONATREMIA: ICD-10-CM

## 2021-10-28 PROCEDURE — 99214 OFFICE O/P EST MOD 30 MIN: CPT | Performed by: INTERNAL MEDICINE

## 2021-10-28 RX ORDER — TOBRAMYCIN 3 MG/ML
1 SOLUTION/ DROPS OPHTHALMIC EVERY 6 HOURS
Qty: 1 EACH | Refills: 0 | Status: SHIPPED | OUTPATIENT
Start: 2021-10-28

## 2021-10-28 RX ORDER — GENTAMICIN SULFATE 3 MG/ML
2 SOLUTION/ DROPS OPHTHALMIC 3 TIMES DAILY
OUTPATIENT
Start: 2021-10-28

## 2021-10-28 NOTE — PROGRESS NOTES
Health Maintenance Due   Topic Date Due    Hepatitis C Screening  Never done    COVID-19 Vaccine (1) Never done    DTaP/Tdap/Td series (1 - Tdap) Never done    Cervical cancer screen  Never done    Flu Vaccine (1) Never done       Chief Complaint   Patient presents with    Stye     Pt states her Rt eye        1. Have you been to the ER, urgent care clinic since your last visit? Hospitalized since your last visit? Yes, [facility] and [place]     2. Have you seen or consulted any other health care providers outside of the [address] [place] [address] since your last visit? Include any pap smears or colon screening. No    3) Do you have an Advance Directive on file? no    4) Are you interested in receiving information on Advance Directives? NO      Patient is accompanied by self I have received verbal consent from [address] to discuss any/all medical information while they are present in the room.

## 2021-10-28 NOTE — PROGRESS NOTES
Mark Tang is a 29 y.o. female who was seen by synchronous (real-time) audio-video technology on 10/28/2021 for Stye (Pt states her Rt eye ) and Anemia        Assessment & Plan:   Diagnoses and all orders for this visit:    1. Blepharitis of right upper eyelid, unspecified type    Warm compression. Will give,  -     tobramycin (TOBREX) 0.3 % ophthalmic solution; Administer 1 Drop to right eye every six (6) hours. 2. Hyponatremia     We will repeat,  -     METABOLIC PANEL, COMPREHENSIVE    3. Fatigue, unspecified type    We will check,  -     CBC WITH AUTOMATED DIFF  -     METABOLIC PANEL, COMPREHENSIVE    4. Iron deficiency anemia, unspecified iron deficiency anemia type     She has menorrhagia, she feels tired. Will check,  -     CBC WITH AUTOMATED DIFF  -     IRON  -     FERRITIN    5. Need for hepatitis C screening test  -     HEPATITIS C AB    6. Vitamin D deficiency    We will check,  -     VITAMIN D, 25 HYDROXY        I spent at least 30 minutes on this visit with this established patient. Subjective:     2 right is here for follow-up. Report right-sided eyelid swelling and stye for past several days. No discharge right now. She has eye discomfort. She has been feeling fatigue and tired. Have heavy menses. Need lab work. Had hyponatremia in the past.  No back pain right now taking no medication. Allergy has improved. Refused Covid vaccine and flu vaccine. Prior to Admission medications    Medication Sig Start Date End Date Taking? Authorizing Provider   tobramycin (TOBREX) 0.3 % ophthalmic solution Administer 1 Drop to right eye every six (6) hours. 10/28/21  Yes Anastasia Camacho MD   ibuprofen (MOTRIN) 600 mg tablet Take 1 Tablet by mouth every six (6) hours as needed for Pain. Patient not taking: Reported on 10/28/2021 10/2/21 10/28/21  Richard Johansen,    lidocaine (XYLOCAINE) 4 % topical cream Apply  to affected area four (4) times daily as needed for Pain.   Patient not taking: Reported on 10/28/2021 10/2/21 10/28/21  Isma JoshieDO   fexofenadine (ALLEGRA) 180 mg tablet TAKE 1 TABLET BY MOUTH DAILY AS NEEDED FOR ALLERGIES. Patient not taking: Reported on 10/28/2021 5/25/21 10/28/21  Bushra Barba MD   fluticasone propionate [facility]) 50 mcg/actuation nasal spray SPRAY 2 SPRAYS INTO EACH NOSTRIL EVERY DAY  Patient not taking: Reported on 10/28/2021 5/24/21 10/28/21  Bushra Barba MD     Past Medical History:   Diagnosis Date    Abnormal Pap smear     Follow-ups normal    Herpes simplex without mention of complication     HX OTHER MEDICAL     Endometriosus       ROS significant for right upper eyelid swelling. Objective:     Patient-Reported Vitals 10/28/2021   Patient-Reported Weight 135 lbs   Patient-Reported LMP 10/28/21          Constitutional: [x] Appears well-developed and well-nourished [x] No apparent distress      [] Abnormal -     Mental status: [x] Alert and awake  [x] Oriented to person/place/time [x] Able to follow commands    [] Abnormal -   -Right upper eyelid: Redness and swelling present, stye present. Conjunctiva not red. No discharge noticed. Left eye looks okay.     HENT: [x] Normocephalic, atraumatic  [] Abnormal -   [x] Mouth/Throat: Mucous membranes are moist    External Ears [x] Normal  [] Abnormal -    Neck: [x] No visualized mass [] Abnormal -     Pulmonary/Chest: [x] Respiratory effort normal   [x] No visualized signs of difficulty breathing or respiratory distress        [] Abnormal -      Musculoskeletal:   [x] Normal gait with no signs of ataxia         [x] Normal range of motion of neck        [] Abnormal -     Neurological:        [x] No Facial Asymmetry (Cranial nerve 7 motor function) (limited exam due to video visit)          [x] No gaze palsy        [] Abnormal -          Psychiatric:       [x] Normal Affect [] Abnormal -        [x] No Hallucinations    Other pertinent observable physical exam findings:-        We discussed the expected course, resolution and complications of the diagnosis(es) in detail. Medication risks, benefits, costs, interactions, and alternatives were discussed as indicated. I advised her to contact the office if her condition worsens, changes or fails to improve as anticipated. She expressed understanding with the diagnosis(es) and plan. Praful Jorge, was evaluated through a synchronous (real-time) audio-video encounter. The patient (or guardian if applicable) is aware that this is a billable service. Verbal consent to proceed has been obtained within the past 12 months. The visit was conducted pursuant to the emergency declaration under the [address] [place] [address], [address] [place] [address] authority and the [employer] Act. Patient identification was verified, and a caregiver was present when appropriate. The patient was located in a state where the provider was credentialed to provide care.       Daljit Mustafa MD

## 2022-03-19 PROBLEM — E04.1 THYROID NODULE: Status: ACTIVE | Noted: 2017-08-03

## 2022-04-25 ENCOUNTER — OFFICE VISIT (OUTPATIENT)
Dept: INTERNAL MEDICINE CLINIC | Age: 37
End: 2022-04-25
Payer: MEDICAID

## 2022-04-25 VITALS
RESPIRATION RATE: 16 BRPM | BODY MASS INDEX: 22.23 KG/M2 | OXYGEN SATURATION: 99 % | SYSTOLIC BLOOD PRESSURE: 120 MMHG | HEIGHT: 65 IN | WEIGHT: 133.4 LBS | HEART RATE: 72 BPM | TEMPERATURE: 98 F | DIASTOLIC BLOOD PRESSURE: 71 MMHG

## 2022-04-25 DIAGNOSIS — Z11.59 NEED FOR HEPATITIS C SCREENING TEST: ICD-10-CM

## 2022-04-25 DIAGNOSIS — E01.0 THYROMEGALY: ICD-10-CM

## 2022-04-25 DIAGNOSIS — E55.9 VITAMIN D DEFICIENCY: ICD-10-CM

## 2022-04-25 DIAGNOSIS — D50.9 IRON DEFICIENCY ANEMIA, UNSPECIFIED IRON DEFICIENCY ANEMIA TYPE: ICD-10-CM

## 2022-04-25 DIAGNOSIS — A60.00 GENITAL HERPES SIMPLEX, UNSPECIFIED SITE: Primary | ICD-10-CM

## 2022-04-25 DIAGNOSIS — R53.83 FATIGUE, UNSPECIFIED TYPE: ICD-10-CM

## 2022-04-25 PROCEDURE — 99214 OFFICE O/P EST MOD 30 MIN: CPT | Performed by: INTERNAL MEDICINE

## 2022-04-25 RX ORDER — VALACYCLOVIR HYDROCHLORIDE 500 MG/1
500 TABLET, FILM COATED ORAL 2 TIMES DAILY
Qty: 6 TABLET | Refills: 3 | Status: SHIPPED | OUTPATIENT
Start: 2022-04-25 | End: 2022-10-12 | Stop reason: SDUPTHER

## 2022-04-25 NOTE — PROGRESS NOTES
Health Maintenance Due   Topic Date Due    Hepatitis C Screening  Never done    COVID-19 Vaccine (1) Never done    DTaP/Tdap/Td series (1 - Tdap) Never done    Cervical cancer screen  Never done       Chief Complaint   Patient presents with    Vitamin D Deficiency    Anemia    Other     6m f/u       1. Have you been to the ER, urgent care clinic since your last visit? Hospitalized since your last visit? No    2. Have you seen or consulted any other health care providers outside of the [address] [place] [address] since your last visit? Include any pap smears or colon screening. No    3) Do you have an Advance Directive on file? no    4) Are you interested in receiving information on Advance Directives? NO      Patient is accompanied by self I have received verbal consent from [facility] to discuss any/all medical information while they are present in the room.

## 2022-04-25 NOTE — PROGRESS NOTES
HISTORY OF PRESENT ILLNESS  Tiffany Koroma is a 29 y.o. female here after long time. She is not feeling fatigue. Had a enlarged thyroid, need to check hormone test.  Has iron deficiency anemia. She is not on iron supplement. Need lab work. She suffer from genital herpes. Her  is incarcerated. Has some sexual encounter but she get genital herpes frequently. Would like to get medicine. Pap smear up-to-date. Refused to get COVID-vaccine. HPI    Review of Systems   Constitutional: Positive for malaise/fatigue. HENT: Negative. Eyes: Negative. Cardiovascular: Negative. Gastrointestinal: Negative. Genitourinary: Negative. Musculoskeletal: Negative. Skin: Negative. Neurological: Negative. Endo/Heme/Allergies: Negative. Physical Exam  Constitutional:       Appearance: Normal appearance. She is normal weight. HENT:      Head: Normocephalic and atraumatic. Right Ear: Tympanic membrane normal.   Neck:      Comments: Thyromegaly present. Cardiovascular:      Rate and Rhythm: Normal rate and regular rhythm. Pulses: Normal pulses. Pulmonary:      Effort: Pulmonary effort is normal.      Breath sounds: Normal breath sounds. Musculoskeletal:         General: Normal range of motion. Cervical back: Normal range of motion and neck supple. Skin:     General: Skin is warm. Neurological:      General: No focal deficit present. Mental Status: She is alert and oriented to person, place, and time. Mental status is at baseline. ASSESSMENT and PLAN  Diagnoses and all orders for this visit:    1. Genital herpes simplex, unspecified site    She has Garcia herpes antibody positive. She has genital herpes outbreak happened several times a year. We will give,  -     valACYclovir (VALTREX) 500 mg tablet; Take 1 Tablet by mouth two (2) times a day. Advised her to use condom if she is not having a steady partner.   2. Vitamin D deficiency    We will check,  -     VITAMIN D, 25 HYDROXY    3. Iron deficiency anemia, unspecified iron deficiency anemia type    Need to be on iron rich diet. Will check,  -     CBC WITH AUTOMATED DIFF  -     IRON PROFILE  -     FERRITIN    4. Need for hepatitis C screening test  -     HEPATITIS C AB    5. Thyromegaly    Not on any hormone. We will recheck,  -     TSH 3RD GENERATION    6. Fatigue, unspecified type    We will check,  -     CBC WITH AUTOMATED DIFF  -     METABOLIC PANEL, COMPREHENSIVE  -     TSH 3RD GENERATION    Discussed expected course/resolution/complications of diagnosis in detail with patient. Medication risks/benefits/costs/interactions/alternatives discussed with patient. Discussed COVID-19 infection precaution with patient. Pt was given an after visit summary which includes diagnoses, current medications & vitals. Pt expressed understanding with the diagnosis and plan.

## 2022-04-26 LAB
25(OH)D3+25(OH)D2 SERPL-MCNC: 15.1 NG/ML (ref 30–100)
ALBUMIN SERPL-MCNC: 4.7 G/DL (ref 3.8–4.8)
ALBUMIN/GLOB SERPL: 1.7 {RATIO} (ref 1.2–2.2)
ALP SERPL-CCNC: 45 IU/L (ref 44–121)
ALT SERPL-CCNC: 11 IU/L (ref 0–32)
AST SERPL-CCNC: 13 IU/L (ref 0–40)
BASOPHILS # BLD AUTO: 0 X10E3/UL (ref 0–0.2)
BASOPHILS NFR BLD AUTO: 1 %
BILIRUB SERPL-MCNC: 0.3 MG/DL (ref 0–1.2)
BUN SERPL-MCNC: 10 MG/DL (ref 6–20)
BUN/CREAT SERPL: 10 (ref 9–23)
CALCIUM SERPL-MCNC: 9.8 MG/DL (ref 8.7–10.2)
CHLORIDE SERPL-SCNC: 105 MMOL/L (ref 96–106)
CO2 SERPL-SCNC: 19 MMOL/L (ref 20–29)
CREAT SERPL-MCNC: 0.96 MG/DL (ref 0.57–1)
EGFR: 79 ML/MIN/1.73
EOSINOPHIL # BLD AUTO: 0 X10E3/UL (ref 0–0.4)
EOSINOPHIL NFR BLD AUTO: 0 %
ERYTHROCYTE [DISTWIDTH] IN BLOOD BY AUTOMATED COUNT: 13.3 % (ref 11.7–15.4)
FERRITIN SERPL-MCNC: 24 NG/ML (ref 15–150)
GLOBULIN SER CALC-MCNC: 2.7 G/DL (ref 1.5–4.5)
GLUCOSE SERPL-MCNC: 70 MG/DL (ref 65–99)
HCT VFR BLD AUTO: 40.8 % (ref 34–46.6)
HCV AB S/CO SERPL IA: <0.1 S/CO RATIO (ref 0–0.9)
HGB BLD-MCNC: 13 G/DL (ref 11.1–15.9)
IMM GRANULOCYTES # BLD AUTO: 0 X10E3/UL (ref 0–0.1)
IMM GRANULOCYTES NFR BLD AUTO: 0 %
IRON SATN MFR SERPL: 39 % (ref 15–55)
IRON SERPL-MCNC: 116 UG/DL (ref 27–159)
LYMPHOCYTES # BLD AUTO: 1.6 X10E3/UL (ref 0.7–3.1)
LYMPHOCYTES NFR BLD AUTO: 54 %
MCH RBC QN AUTO: 30 PG (ref 26.6–33)
MCHC RBC AUTO-ENTMCNC: 31.9 G/DL (ref 31.5–35.7)
MCV RBC AUTO: 94 FL (ref 79–97)
MONOCYTES # BLD AUTO: 0.3 X10E3/UL (ref 0.1–0.9)
MONOCYTES NFR BLD AUTO: 10 %
NEUTROPHILS # BLD AUTO: 1.1 X10E3/UL (ref 1.4–7)
NEUTROPHILS NFR BLD AUTO: 35 %
PLATELET # BLD AUTO: 162 X10E3/UL (ref 150–450)
POTASSIUM SERPL-SCNC: 4.6 MMOL/L (ref 3.5–5.2)
PROT SERPL-MCNC: 7.4 G/DL (ref 6–8.5)
RBC # BLD AUTO: 4.33 X10E6/UL (ref 3.77–5.28)
SODIUM SERPL-SCNC: 138 MMOL/L (ref 134–144)
TIBC SERPL-MCNC: 298 UG/DL (ref 250–450)
TSH SERPL DL<=0.005 MIU/L-ACNC: 0.5 UIU/ML (ref 0.45–4.5)
UIBC SERPL-MCNC: 182 UG/DL (ref 131–425)
WBC # BLD AUTO: 3 X10E3/UL (ref 3.4–10.8)

## 2022-04-29 DIAGNOSIS — E55.9 VITAMIN D DEFICIENCY: Primary | ICD-10-CM

## 2022-04-29 RX ORDER — ERGOCALCIFEROL 1.25 MG/1
50000 CAPSULE ORAL
Qty: 12 CAPSULE | Refills: 0 | Status: SHIPPED | OUTPATIENT
Start: 2022-04-29 | End: 2022-10-26 | Stop reason: SDUPTHER

## 2022-04-29 NOTE — PROGRESS NOTES
WBC is back to almost normal   Vitamin d low.  Send in [id number] weekly for 12 weeks and then take OTC daily     All other labs stable

## 2022-09-30 ENCOUNTER — TELEPHONE (OUTPATIENT)
Dept: INTERNAL MEDICINE CLINIC | Age: 37
End: 2022-09-30

## 2022-09-30 ENCOUNTER — VIRTUAL VISIT (OUTPATIENT)
Dept: INTERNAL MEDICINE CLINIC | Age: 37
End: 2022-09-30
Payer: MEDICAID

## 2022-09-30 DIAGNOSIS — R53.83 FATIGUE, UNSPECIFIED TYPE: ICD-10-CM

## 2022-09-30 DIAGNOSIS — E01.0 THYROMEGALY: ICD-10-CM

## 2022-09-30 DIAGNOSIS — D72.819 LEUKOPENIA, UNSPECIFIED TYPE: Primary | ICD-10-CM

## 2022-09-30 DIAGNOSIS — E04.1 THYROID NODULE: ICD-10-CM

## 2022-09-30 DIAGNOSIS — E55.9 VITAMIN D DEFICIENCY: ICD-10-CM

## 2022-09-30 DIAGNOSIS — Z13.220 SCREENING, LIPID: ICD-10-CM

## 2022-09-30 PROCEDURE — 99214 OFFICE O/P EST MOD 30 MIN: CPT | Performed by: INTERNAL MEDICINE

## 2022-09-30 NOTE — TELEPHONE ENCOUNTER
----- Message from Jessica Perez sent at 9/30/2022  2:37 PM EDT -----  Subject: Referral Request    Reason for referral request? Patient states spoke with Dr. Mello Feldman and she   gave a specific ultrasound location to get it done at and she lost the   information. I offered main scheduling info for test scheduling and she   states that is not the number. Pt asked for return call. Provider patient wants to be referred to(if known):     Provider Phone Number(if known):     Additional Information for Provider?   ---------------------------------------------------------------------------  --------------  [id number] [employer]    [phone]; OK to leave message on voicemail  ---------------------------------------------------------------------------  --------------

## 2022-09-30 NOTE — PROGRESS NOTES
Kareen Manuel is a 29 y.o. female who was seen by synchronous (real-time) audio-video technology on 9/30/2022 for Skin Problem (Skin tag /)        Assessment & Plan:   Diagnoses and all orders for this visit:    1. Leukopenia, unspecified type  Will repeat,    -     CBC WITH AUTOMATED DIFF    2. Thyromegaly    Getting worse. Will check,  -     US THYROID/PARATHYROID/SOFT TISS; Future    3. Thyroid nodule    -     US THYROID/PARATHYROID/SOFT TISS; Future  -     TSH 3RD GENERATION    4. Vitamin D deficiency  -     VITAMIN D, 25 HYDROXY    5. Fatigue, unspecified type    We will check,  -     TSH 3RD GENERATION  -     METABOLIC PANEL, COMPREHENSIVE  -     CBC WITH AUTOMATED DIFF    6. Screening, lipid    We will check,  -     LIPID PANEL      I spent at least 32 minutes on this visit with this established patient. Subjective:     Ms. Suazo is here for follow-up. Reported swelling in front of the neck. She had goiter and thyroid nodule in the past.  Did not have an ultrasound for long time. Feeling fatigue sometimes. Noticed to have a small mole/skin tag on the neck which fell off. I have advised her to monitor the area and I will if it is growing, she needs to contact me. Lab work reviewed, had leukopenia. .  Repeat lab work. Had low vitamin D, finished of the supplement long time back. Did not receive flu shot yet. Prior to Admission medications    Medication Sig Start Date End Date Taking? Authorizing Provider   ergocalciferol (ERGOCALCIFEROL) 1,250 mcg (50,000 unit) capsule Take 1 Capsule by mouth every seven (7) days. 4/29/22  Yes Kathryn Vogel MD   valACYclovir (VALTREX) 500 mg tablet Take 1 Tablet by mouth two (2) times a day. 4/25/22  Yes Kathryn Vogel MD   tobramycin (TOBREX) 0.3 % ophthalmic solution Administer 1 Drop to right eye every six (6) hours.  10/28/21  Yes Kathryn Vogel MD     Past Medical History:   Diagnosis Date    Abnormal Pap smear     Follow-ups normal    Herpes simplex without mention of complication     HX OTHER MEDICAL     Endometriosus       ROS significant for neck swelling, and fatigue. Objective:     Patient-Reported Vitals 10/28/2021   Patient-Reported Weight 135 lbs   Patient-Reported LMP 10/28/21          Constitutional: [x] Appears well-developed and well-nourished [x] No apparent distress      [] Abnormal -     Mental status: [x] Alert and awake  [x] Oriented to person/place/time [x] Able to follow commands    [] Abnormal -     Eyes:   EOM    [x]  Normal    [] Abnormal -   Sclera  [x]  Normal    [] Abnormal -          Discharge [x]  None visible   [] Abnormal -     HENT: [x] Normocephalic, atraumatic  [] Abnormal -   [x] Mouth/Throat: Mucous membranes are moist  Neck: Goiter present. External Ears [x] Normal  [] Abnormal -    Neck: [x] No visualized mass [] Abnormal -     Pulmonary/Chest: [x] Respiratory effort normal   [x] No visualized signs of difficulty breathing or respiratory distress        [] Abnormal -      Musculoskeletal:   [x] Normal gait with no signs of ataxia         [x] Normal range of motion of neck        [] Abnormal -     Neurological:        [x] No Facial Asymmetry (Cranial nerve 7 motor function) (limited exam due to video visit)          [x] No gaze palsy        [] Abnormal -          Skin:        [x] No significant exanthematous lesions or discoloration noted on facial skin    Left side of the neck: Small's skin tag or mole, fell off. Now there is a small dark color mole present. [] Abnormal -            Psychiatric:       [x] Normal Affect [] Abnormal -        [x] No Hallucinations    Other pertinent observable physical exam findings:-        We discussed the expected course, resolution and complications of the diagnosis(es) in detail. Medication risks, benefits, costs, interactions, and alternatives were discussed as indicated. I advised her to contact the office if her condition worsens, changes or fails to improve as anticipated.  She expressed understanding with the diagnosis(es) and plan. Stanton Rosalind, was evaluated through a synchronous (real-time) audio-video encounter. The patient (or guardian if applicable) is aware that this is a billable service, which includes applicable co-pays. This Virtual Visit was conducted with patient's (and/or legal guardian's) consent. The visit was conducted pursuant to the emergency declaration under the [address] [place] [address] and the [employer] General Act. Patient identification was verified, and a caregiver was present when appropriate.   The patient was located at: Home: [address] [address]  The provider was located at: Home: [unfilled]        Rani Medina MD

## 2022-10-05 LAB
25(OH)D3+25(OH)D2 SERPL-MCNC: 16.1 NG/ML (ref 30–100)
ALBUMIN SERPL-MCNC: 4.3 G/DL (ref 3.8–4.8)
ALBUMIN/GLOB SERPL: 1.7 {RATIO} (ref 1.2–2.2)
ALP SERPL-CCNC: 44 IU/L (ref 44–121)
ALT SERPL-CCNC: 10 IU/L (ref 0–32)
AST SERPL-CCNC: 15 IU/L (ref 0–40)
BASOPHILS # BLD AUTO: 0 X10E3/UL (ref 0–0.2)
BASOPHILS NFR BLD AUTO: 1 %
BILIRUB SERPL-MCNC: 0.2 MG/DL (ref 0–1.2)
BUN SERPL-MCNC: 7 MG/DL (ref 6–20)
BUN/CREAT SERPL: 7 (ref 9–23)
CALCIUM SERPL-MCNC: 9 MG/DL (ref 8.7–10.2)
CHLORIDE SERPL-SCNC: 104 MMOL/L (ref 96–106)
CHOLEST SERPL-MCNC: 190 MG/DL (ref 100–199)
CO2 SERPL-SCNC: 22 MMOL/L (ref 20–29)
CREAT SERPL-MCNC: 0.95 MG/DL (ref 0.57–1)
EGFR: 80 ML/MIN/1.73
EOSINOPHIL # BLD AUTO: 0 X10E3/UL (ref 0–0.4)
EOSINOPHIL NFR BLD AUTO: 1 %
ERYTHROCYTE [DISTWIDTH] IN BLOOD BY AUTOMATED COUNT: 13.1 % (ref 11.7–15.4)
GLOBULIN SER CALC-MCNC: 2.5 G/DL (ref 1.5–4.5)
GLUCOSE SERPL-MCNC: 71 MG/DL (ref 70–99)
HCT VFR BLD AUTO: 34.9 % (ref 34–46.6)
HDLC SERPL-MCNC: 57 MG/DL
HGB BLD-MCNC: 11.3 G/DL (ref 11.1–15.9)
IMM GRANULOCYTES # BLD AUTO: 0 X10E3/UL (ref 0–0.1)
IMM GRANULOCYTES NFR BLD AUTO: 0 %
IMP & REVIEW OF LAB RESULTS: NORMAL
LDLC SERPL CALC-MCNC: 123 MG/DL (ref 0–99)
LYMPHOCYTES # BLD AUTO: 1.4 X10E3/UL (ref 0.7–3.1)
LYMPHOCYTES NFR BLD AUTO: 55 %
MCH RBC QN AUTO: 30.3 PG (ref 26.6–33)
MCHC RBC AUTO-ENTMCNC: 32.4 G/DL (ref 31.5–35.7)
MCV RBC AUTO: 94 FL (ref 79–97)
MONOCYTES # BLD AUTO: 0.3 X10E3/UL (ref 0.1–0.9)
MONOCYTES NFR BLD AUTO: 11 %
MORPHOLOGY BLD-IMP: ABNORMAL
NEUTROPHILS # BLD AUTO: 0.8 X10E3/UL (ref 1.4–7)
NEUTROPHILS NFR BLD AUTO: 32 %
PLATELET # BLD AUTO: 172 X10E3/UL (ref 150–450)
POTASSIUM SERPL-SCNC: 4.5 MMOL/L (ref 3.5–5.2)
PROT SERPL-MCNC: 6.8 G/DL (ref 6–8.5)
RBC # BLD AUTO: 3.73 X10E6/UL (ref 3.77–5.28)
SODIUM SERPL-SCNC: 138 MMOL/L (ref 134–144)
TRIGL SERPL-MCNC: 54 MG/DL (ref 0–149)
TSH SERPL DL<=0.005 MIU/L-ACNC: 0.24 UIU/ML (ref 0.45–4.5)
VLDLC SERPL CALC-MCNC: 10 MG/DL (ref 5–40)
WBC # BLD AUTO: 2.5 X10E3/UL (ref 3.4–10.8)

## 2022-10-05 NOTE — PROGRESS NOTES
WBC count and neutrophil count very low. Please refer patient to Yaritza Yuonger, hematologist.  TSH is low. Please repeat TSH with free T4 within thyroid antibody panel in 6 weeks. vit D level very low.will start on vit D 50,000 unit 1 cap weekly for 4 months. will repeat level in 4 months. adv to be on milk product and expose to sun for 20 min a day. Slightly elevated LDL, advised to be on low-cholesterol diet and exercise.

## 2022-10-11 ENCOUNTER — TELEPHONE (OUTPATIENT)
Dept: INTERNAL MEDICINE CLINIC | Age: 37
End: 2022-10-11

## 2022-10-11 DIAGNOSIS — D72.9 ABNORMAL WHITE BLOOD CELL (WBC) COUNT: Primary | ICD-10-CM

## 2022-10-11 DIAGNOSIS — D72.9 NEUTROPHILIA: ICD-10-CM

## 2022-10-11 DIAGNOSIS — D72.828 ACUTE NEUTROPHILIA: ICD-10-CM

## 2022-10-11 DIAGNOSIS — D70.9 NEUTROPENIA, UNSPECIFIED TYPE (HCC): ICD-10-CM

## 2022-10-11 NOTE — TELEPHONE ENCOUNTER
----- Message from Sanjay oCchran MD sent at 10/5/2022  5:12 PM EDT -----  WBC count and neutrophil count very low. Please refer patient to Natalya Regan, hematologist.  TSH is low. Please repeat TSH with free T4 within thyroid antibody panel in 6 weeks. vit D level very low.will start on vit D 50,000 unit 1 cap weekly for 4 months. will repeat level in 4 months. adv to be on milk product and expose to sun for 20 min a day. Slightly elevated LDL, advised to be on low-cholesterol diet and exercise.

## 2022-10-12 ENCOUNTER — TELEPHONE (OUTPATIENT)
Dept: INTERNAL MEDICINE CLINIC | Age: 37
End: 2022-10-12

## 2022-10-12 DIAGNOSIS — A60.00 GENITAL HERPES SIMPLEX, UNSPECIFIED SITE: ICD-10-CM

## 2022-10-12 RX ORDER — VALACYCLOVIR HYDROCHLORIDE 500 MG/1
500 TABLET, FILM COATED ORAL 2 TIMES DAILY
Qty: 14 TABLET | Refills: 0 | Status: SHIPPED | OUTPATIENT
Start: 2022-10-12 | End: 2022-10-26 | Stop reason: SDUPTHER

## 2022-10-12 NOTE — TELEPHONE ENCOUNTER
Patient has new herpes outbreak and is requesting a fill of this. Requested Prescriptions     Pending Prescriptions Disp Refills    valACYclovir (VALTREX) 500 mg tablet 6 Tablet 3     Sig: Take 1 Tablet by mouth two (2) times a day.          [facility]/pharmacy #[id number] Chapin Hendricks, [address] [place] [address]  Phone: [phone] Fax: [phone]       9/30/2022 is LAST OFFICE VISIT     Future Appointments   Date Time Provider Clifford Cardoza   10/25/2022 11:30 AM [address] [place] [address]   10/26/2022  9:45 AM Jed Luciano MD [facility]

## 2022-10-12 NOTE — TELEPHONE ENCOUNTER
Pt had a herpes outbreak on her buttocks and was prescribed medication last year. She cannot remember the name of the medication and wants to know if she can get it refilled?   Please contact pt- she would like script sent to [address] 9/30/22  Nov: 10/26/22

## 2022-10-25 ENCOUNTER — HOSPITAL ENCOUNTER (OUTPATIENT)
Dept: ULTRASOUND IMAGING | Age: 37
Discharge: HOME | End: 2022-10-25
Attending: INTERNAL MEDICINE
Payer: MEDICAID

## 2022-10-25 DIAGNOSIS — E04.1 THYROID NODULE: ICD-10-CM

## 2022-10-25 DIAGNOSIS — E01.0 THYROMEGALY: ICD-10-CM

## 2022-10-25 PROCEDURE — 76536 US EXAM OF HEAD AND NECK: CPT

## 2022-10-26 ENCOUNTER — OFFICE VISIT (OUTPATIENT)
Dept: INTERNAL MEDICINE CLINIC | Age: 37
End: 2022-10-26
Payer: MEDICAID

## 2022-10-26 VITALS
BODY MASS INDEX: 25.12 KG/M2 | DIASTOLIC BLOOD PRESSURE: 62 MMHG | OXYGEN SATURATION: 98 % | TEMPERATURE: 97.8 F | WEIGHT: 150.8 LBS | HEIGHT: 65 IN | SYSTOLIC BLOOD PRESSURE: 112 MMHG | HEART RATE: 98 BPM | RESPIRATION RATE: 16 BRPM

## 2022-10-26 DIAGNOSIS — E55.9 VITAMIN D DEFICIENCY: ICD-10-CM

## 2022-10-26 DIAGNOSIS — D72.819 LEUKOPENIA, UNSPECIFIED TYPE: Primary | ICD-10-CM

## 2022-10-26 DIAGNOSIS — A60.00 GENITAL HERPES SIMPLEX, UNSPECIFIED SITE: ICD-10-CM

## 2022-10-26 DIAGNOSIS — M62.838 NECK MUSCLE SPASM: ICD-10-CM

## 2022-10-26 DIAGNOSIS — E01.0 THYROMEGALY: ICD-10-CM

## 2022-10-26 PROCEDURE — 99214 OFFICE O/P EST MOD 30 MIN: CPT | Performed by: INTERNAL MEDICINE

## 2022-10-26 RX ORDER — VALACYCLOVIR HYDROCHLORIDE 500 MG/1
500 TABLET, FILM COATED ORAL DAILY
Qty: 30 TABLET | Refills: 5 | Status: SHIPPED | OUTPATIENT
Start: 2022-10-26

## 2022-10-26 RX ORDER — NORETHINDRONE ACETATE AND ETHINYL ESTRADIOL AND FERROUS FUMARATE 1MG-20(24)
KIT ORAL
COMMUNITY
Start: 2022-10-18

## 2022-10-26 RX ORDER — ERGOCALCIFEROL 1.25 MG/1
50000 CAPSULE ORAL
Qty: 12 CAPSULE | Refills: 0 | Status: SHIPPED | OUTPATIENT
Start: 2022-10-26

## 2022-10-26 NOTE — PROGRESS NOTES
Stable thyromegaly. Mildly increased thyroid gland mixed solid and cystic. If she wants she can see an endocrinologist Dr. Nishant Mccarty.

## 2022-10-26 NOTE — PROGRESS NOTES
HISTORY OF PRESENT ILLNESS  Angélica Suazo is a 40 y.o. female here for follow-up. Report and headache on and off. Has stiffness in the neck. She is under a lot of stress. She is self-employed. Lab work reviewed, CBC showed leukopenia. Need to repeat lab work. She is having frequent. Do not restart break. Not taking Valtrex regular basis. Pap smear up-to-date. Had low vitamin D, did not take the high-dose of vitamin D. Refused vaccine. HPI    Review of Systems   Constitutional: Negative. HENT: Negative. Eyes: Negative. Respiratory: Negative. Cardiovascular: Negative. Gastrointestinal: Negative. Genitourinary: Negative. Musculoskeletal: Negative. Skin: Negative. Neurological: Negative. Endo/Heme/Allergies: Negative. Physical Exam  Constitutional:       Appearance: Normal appearance. She is normal weight. HENT:      Head: Normocephalic and atraumatic. Right Ear: Tympanic membrane normal.   Neck:      Comments: Thyromegaly present. Cardiovascular:      Rate and Rhythm: Normal rate and regular rhythm. Pulses: Normal pulses. Pulmonary:      Effort: Pulmonary effort is normal.      Breath sounds: Normal breath sounds. Abdominal:      General: Abdomen is flat. Bowel sounds are normal.      Palpations: Abdomen is soft. Musculoskeletal:         General: Normal range of motion. Cervical back: Normal range of motion and neck supple. Comments: Neck: Spine nontender. Paravertebral muscle spasm present. Range of motion restricted. Skin:     General: Skin is warm. Neurological:      General: No focal deficit present. Mental Status: She is alert and oriented to person, place, and time. Mental status is at baseline. Psychiatric:         Behavior: Behavior normal.         Thought Content: Thought content normal.      Comments: Under a lot of stress. ASSESSMENT and PLAN    Diagnoses and all orders for this visit:    1.  Leukopenia, unspecified type    We will repeat,  -     CBC WITH AUTOMATED DIFF    2. Genital herpes simplex, unspecified site    She is having frequent outbreaks from genital herpes. Advised her to take Valtrex every single day. -     valACYclovir (VALTREX) 500 mg tablet; Take 1 Tablet by mouth daily. 3. Thyromegaly  Had ultrasound of thyroid done, will follow up. 4. Vitamin D deficiency    did not take any high-dose of vitamin D. We will call in,  -     ergocalciferol (ERGOCALCIFEROL) 1,250 mcg (50,000 unit) capsule; Take 1 Capsule by mouth every seven (7) days. 5. Neck muscle spasm    Heating pad and massage. As she is under a lot of stress. We will give her information about neck spasm exercise. Discussed expected course/resolution/complications of diagnosis in detail with patient. Medication risks/benefits/costs/interactions/alternatives discussed with patient. Discussed COVID-19 infection precaution with patient. Pt was given an after visit summary which includes diagnoses, current medications & vitals. Pt expressed understanding with the diagnosis and plan.

## 2022-10-26 NOTE — PROGRESS NOTES
Nursing staff confirmed patient with full name and . Prepared patient for visit today by obtaining vitals, verifying medication list and allergies, and briefly discussing reason for visit. Chief Complaint   Patient presents with    Follow Up Chronic Condition       Current Outpatient Medications   Medication Sig    Junel Fe 24 1 mg-20 mcg (24)/75 mg (4) tab     valACYclovir (VALTREX) 500 mg tablet Take 1 Tablet by mouth two (2) times a day. ergocalciferol (ERGOCALCIFEROL) 1,250 mcg (50,000 unit) capsule Take 1 Capsule by mouth every seven (7) days. tobramycin (TOBREX) 0.3 % ophthalmic solution Administer 1 Drop to right eye every six (6) hours. No current facility-administered medications for this visit. 1. \"Have you been to the ER, urgent care clinic since your last visit? Hospitalized since your last visit? \" No    2. \"Have you seen or consulted any other health care providers outside of the [address] [place] [address] since your last visit? \" No     3. For patients aged 39-70: Has the patient had a colonoscopy / FIT/ Cologuard? NA - based on age      If the patient is female:    4. For patients aged 41-77: Has the patient had a mammogram within the past 2 years? NA - based on age or sex      11. For patients aged 21-65: Has the patient had a pap smear?  No

## 2022-11-10 ENCOUNTER — TELEPHONE (OUTPATIENT)
Dept: INTERNAL MEDICINE CLINIC | Age: 37
End: 2022-11-10

## 2022-11-10 DIAGNOSIS — E01.0 THYROMEGALY: Primary | ICD-10-CM

## 2022-11-10 NOTE — TELEPHONE ENCOUNTER
Nicole Mayer MD   10/26/2022  5:07 PM EDT       Stable thyromegaly. Mildly increased thyroid gland mixed solid and cystic. If she wants she can see an endocrinologist Dr. Silvia Smith. Veronica Freire was called and verbalized understanding on note below.        Referral sent

## 2022-11-10 NOTE — TELEPHONE ENCOUNTER
Patient would like a call back with the results of her ultrasound. Please call her back at [phone]

## 2023-02-10 ENCOUNTER — OFFICE VISIT (OUTPATIENT)
Dept: ONCOLOGY | Age: 38
End: 2023-02-10
Payer: MEDICAID

## 2023-02-10 VITALS
TEMPERATURE: 98 F | SYSTOLIC BLOOD PRESSURE: 114 MMHG | DIASTOLIC BLOOD PRESSURE: 73 MMHG | WEIGHT: 153 LBS | HEART RATE: 63 BPM | OXYGEN SATURATION: 97 % | RESPIRATION RATE: 18 BRPM | BODY MASS INDEX: 25.46 KG/M2

## 2023-02-10 DIAGNOSIS — D70.9 NEUTROPENIA, UNSPECIFIED TYPE (HCC): Primary | ICD-10-CM

## 2023-02-10 NOTE — PROGRESS NOTES
Maliha Reyes is a 40 y.o. female    Chief Complaint   Patient presents with    New Patient     leukopenia with neutropenia       1. Have you been to the ER, urgent care clinic since your last visit? Hospitalized since your last visit? No    2. Have you seen or consulted any other health care providers outside of the [address] [place] [address] since your last visit? Include any pap smears or colon screening.  No yes

## 2023-02-10 NOTE — LETTER
2/10/2023    Patient: Ariane Farr   YOB: 1985   Date of Visit: 2/10/2023     Antonia Layton MD  [address] [place] [id number]  [address] [place]    Dear Antonia Layton MD,      Thank you for referring Ms. Isabela Larson May to [address] [place] [address] for evaluation. My notes for this consultation are attached. If you have questions, please do not hesitate to call me. I look forward to following your patient along with you.       Sincerely,    Manuel Agrawal MD

## 2023-02-10 NOTE — PROGRESS NOTES
[facility] at [address]  W: [phone]  F: [phone]    Reason for Visit:   Maliha Reyes is a 40 y.o. female with no significant PMHx who is seen in consultation at the request of Dr. Candace Victor for evaluation of leukopenia with neutropenia. History of Present Illness:   Lacy Suazo is a pleasant 40 y.o. female who presents today for evaluation of leukopenia with neutropenia. Patient presented to her PCP for routine evaluation 4/2022. Labs demonstrated leukopenia with mild neutropenia (ANC 1.1). Other cell lines preserved. On repeat labs, 10/2022 leukopenia and neutropenia persisted with WBC 2.5, ANC 0.8. Other cell lines remained preserved. review, she has had borderline low WBC since 2012 (other than episode of leukocytosis in 4/2021). More recently, she has had jacque leukopenia with neutropenia. Patient overall feels well. Denies fevers, chills, night sweats, lumps/bumps, early satiety, abdominal pain or fullness, unintentional weight loss, rashes, recurrent infections. Weight stable at 150 - 155. 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 She has no significant medical issues. Reports that in 2019, she had severe GI issues, required multiple hospitalizations. Changed her diet with vast improvement. Has a history of genital herpes, for which she takes valtrex as needed. She only takes this for outbreaks. Last outbreak 2 weeks ago. Social History:  Never smoker. Social EtOH use (3x per month). 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 Lives with her daughter (6). Family history: Mother, father - alive, healthy  1 half siblings on dad, 3 half siblings   Daughter - no medical issues  No medical issues in her immediate family specifically no cancer, blood disorder. Review of systems was obtained and pertinent findings reviewed above.  Past medical history, social history, family history, medications, and allergies are located in the electronic medical record. Physical Exam:   Visit Vitals  /73 (BP 1 Location: Right upper arm, BP Patient Position: Sitting)   Pulse 63   Temp 98 °F (36.7 °C)   Resp 18   Wt 153 lb (69.4 kg)   SpO2 97%   BMI 25.46 kg/m²     ECOG PS: 0  General: no distress  Eyes: anicteric sclerae  HENT: oropharynx clear  Neck: supple  Lymphatic: no cervical, supraclavicular, or inguinal adenopathy  Respiratory: normal respiratory effort  CV: no peripheral edema  Ext: warm, well perfused  GI: soft, nontender, nondistended, no masses  Skin: no rashes, no ecchymoses, no petechiae    Results:     Lab Results   Component Value Date/Time    WBC 2.5 (LL) 10/04/2022 11:11 AM    HGB 11.3 10/04/2022 11:11 AM    HCT 34.9 10/04/2022 11:11 AM    PLATELET 632 99/09/1179 11:11 AM    MCV 94 10/04/2022 11:11 AM    ABS. NEUTROPHILS 0.8 (L) 10/04/2022 11:11 AM     Lab Results   Component Value Date/Time    Sodium 138 10/04/2022 11:11 AM    Potassium 4.5 10/04/2022 11:11 AM    Chloride 104 10/04/2022 11:11 AM    CO2 22 10/04/2022 11:11 AM    Glucose 71 10/04/2022 11:11 AM    BUN 7 10/04/2022 11:11 AM    Creatinine 0.95 10/04/2022 11:11 AM    GFR est AA >60 04/14/2021 05:34 PM    GFR est non-AA >60 04/14/2021 05:34 PM    Calcium 9.0 10/04/2022 11:11 AM     Lab Results   Component Value Date/Time    Bilirubin, total 0.2 10/04/2022 11:11 AM    ALT (SGPT) 10 10/04/2022 11:11 AM    Alk. phosphatase 44 10/04/2022 11:11 AM    Protein, total 6.8 10/04/2022 11:11 AM    Albumin 4.3 10/04/2022 11:11 AM    Globulin 5.0 (H) 04/14/2021 05:34 PM     Records from [facility] reviewed and summarized above. Test results above have been reviewed. Assessment:     #) Neutropenia, moderate:  Patient has had long-standing low WBC with borderline neutrophil count for years. Tal neutropenia first noted 11/2020, now persistent on serial rechecks. Other cell lines preserved. The most common etiology, especially in patients without recurrent infections, would be benign ethnic neutropenia, as s commonly have a lower WBC than Caucasians. There is no evidence by history of an inherited neutropenia, no history of chronic or recurrent infection, and no medication that is likely to be contributing. I will explore some other possibilities today with labwork (smear review, ESR, LD, LUIS, B12, folate, HIV, gammopathy eval). Pending the above work up, we will consider bone marrow biopsy if [address] persistently <1000.       Plan:     CBC with diff, smear, ESR, LD, LUIS, B12, folate, HIV, gammopathy eval   Return to clinic in 2 weeks to discuss results     Signed By: Erin Orozco MD cancer

## 2023-02-10 NOTE — PROGRESS NOTES
Irma Cannon is a 40 y.o. female    leukopenia with neutropenia. 1. Have you been to the ER, urgent care clinic since your last visit? Hospitalized since your last visit? No    2. Have you seen or consulted any other health care providers outside of the [address] [place] [address] since your last visit? Include any pap smears or colon screening.  No

## 2023-02-27 DIAGNOSIS — E53.8 B12 DEFICIENCY: ICD-10-CM

## 2023-02-27 DIAGNOSIS — D70.9 NEUTROPENIA, UNSPECIFIED TYPE (HCC): Primary | ICD-10-CM

## 2023-02-27 RX ORDER — LANOLIN ALCOHOL/MO/W.PET/CERES
1000 CREAM (GRAM) TOPICAL DAILY
Qty: 90 TABLET | Refills: 1 | Status: SHIPPED | OUTPATIENT
Start: 2023-02-27

## 2023-03-06 ENCOUNTER — TELEPHONE (OUTPATIENT)
Dept: ONCOLOGY | Age: 38
End: 2023-03-06

## 2023-03-06 NOTE — TELEPHONE ENCOUNTER
Amber mariano   Reached back out to Ms May. Discussed labs and informed her that Dr Calvin Molina would like to see her in 1 month and putting labs to be done prior to visit in the mail. Patient would like scheduling to reach out to her for a 1 month appointment. Thanked us for the call.

## 2023-03-28 ENCOUNTER — TELEPHONE (OUTPATIENT)
Dept: ONCOLOGY | Age: 38
End: 2023-03-28

## 2023-03-28 PROBLEM — E53.8 B12 DEFICIENCY: Status: ACTIVE | Noted: 2023-03-28

## 2023-03-28 NOTE — TELEPHONE ENCOUNTER
Guerrero mariano   Reached out to inquire about labs that were to be drawn. Patient stated they have not been done but is going today. Agreed to reschedule for next week. Please reach out to reschedule. Thank you.

## 2023-03-31 LAB
ALBUMIN SERPL ELPH-MCNC: 4 G/DL (ref 2.9–4.4)
ALBUMIN/GLOB SERPL: 1.3 {RATIO} (ref 0.7–1.7)
ALPHA1 GLOB SERPL ELPH-MCNC: 0.2 G/DL (ref 0–0.4)
ALPHA2 GLOB SERPL ELPH-MCNC: 0.7 G/DL (ref 0.4–1)
ANA SER QL: NEGATIVE
B-GLOBULIN SERPL ELPH-MCNC: 1.1 G/DL (ref 0.7–1.3)
BASOPHILS # BLD AUTO: 0 X10E3/UL (ref 0–0.2)
BASOPHILS NFR BLD AUTO: 1 %
EOSINOPHIL # BLD AUTO: 0 X10E3/UL (ref 0–0.4)
EOSINOPHIL NFR BLD AUTO: 1 %
ERYTHROCYTE [DISTWIDTH] IN BLOOD BY AUTOMATED COUNT: 12.8 % (ref 11.7–15.4)
ERYTHROCYTE [SEDIMENTATION RATE] IN BLOOD BY WESTERGREN METHOD: 16 MM/HR (ref 0–32)
FOLATE SERPL-MCNC: 19.8 NG/ML
GAMMA GLOB SERPL ELPH-MCNC: 1.3 G/DL (ref 0.4–1.8)
GLOBULIN SER-MCNC: 3.3 G/DL (ref 2.2–3.9)
HCT VFR BLD AUTO: 37.2 % (ref 34–46.6)
HCV IGG SERPL QL IA: NON REACTIVE
HGB BLD-MCNC: 12.3 G/DL (ref 11.1–15.9)
HIV 1+2 AB+HIV1 P24 AG SERPL QL IA: NON REACTIVE
IGA SERPL-MCNC: 371 MG/DL (ref 87–352)
IGG SERPL-MCNC: 1214 MG/DL (ref 586–1602)
IGM SERPL-MCNC: 141 MG/DL (ref 26–217)
IMM GRANULOCYTES # BLD AUTO: 0 X10E3/UL (ref 0–0.1)
IMM GRANULOCYTES NFR BLD AUTO: 0 %
INTERPRETATION SERPL IEP-IMP: ABNORMAL
KAPPA LC FREE SER-MCNC: 19.1 MG/L (ref 3.3–19.4)
KAPPA LC FREE/LAMBDA FREE SER: 1.38 {RATIO} (ref 0.26–1.65)
LABORATORY COMMENT REPORT: ABNORMAL
LAMBDA LC FREE SERPL-MCNC: 13.8 MG/L (ref 5.7–26.3)
LDH SERPL L TO P-CCNC: 144 IU/L (ref 119–226)
LYMPHOCYTES # BLD AUTO: 1.2 X10E3/UL (ref 0.7–3.1)
LYMPHOCYTES NFR BLD AUTO: 50 %
M PROTEIN SERPL ELPH-MCNC: ABNORMAL G/DL
MCH RBC QN AUTO: 31 PG (ref 26.6–33)
MCHC RBC AUTO-ENTMCNC: 33.1 G/DL (ref 31.5–35.7)
MCV RBC AUTO: 94 FL (ref 79–97)
METHYLMALONATE SERPL-SCNC: 139 NMOL/L (ref 0–378)
MONOCYTES # BLD AUTO: 0.2 X10E3/UL (ref 0.1–0.9)
MONOCYTES NFR BLD AUTO: 10 %
MORPHOLOGY BLD-IMP: ABNORMAL
NEUTROPHILS # BLD AUTO: 0.9 X10E3/UL (ref 1.4–7)
NEUTROPHILS NFR BLD AUTO: 38 %
PLATELET # BLD AUTO: 232 X10E3/UL (ref 150–450)
PROT SERPL-MCNC: 7.3 G/DL (ref 6–8.5)
RBC # BLD AUTO: 3.97 X10E6/UL (ref 3.77–5.28)
VIT B12 SERPL-MCNC: 342 PG/ML (ref 232–1245)
WBC # BLD AUTO: 2.4 X10E3/UL (ref 3.4–10.8)

## 2023-08-08 ENCOUNTER — OFFICE VISIT (OUTPATIENT)
Age: 38
End: 2023-08-08
Payer: MEDICAID

## 2023-08-08 VITALS
SYSTOLIC BLOOD PRESSURE: 102 MMHG | WEIGHT: 158.2 LBS | DIASTOLIC BLOOD PRESSURE: 65 MMHG | HEIGHT: 65 IN | BODY MASS INDEX: 26.36 KG/M2 | HEART RATE: 51 BPM

## 2023-08-08 DIAGNOSIS — E04.1 THYROID NODULE: Primary | ICD-10-CM

## 2023-08-08 DIAGNOSIS — R79.89 LOW TSH LEVEL: ICD-10-CM

## 2023-08-08 DIAGNOSIS — E04.1 THYROID NODULE: ICD-10-CM

## 2023-08-08 PROCEDURE — 99204 OFFICE O/P NEW MOD 45 MIN: CPT | Performed by: INTERNAL MEDICINE

## 2023-08-08 RX ORDER — MAGNESIUM GLUCONATE 27 MG(500)
500 TABLET ORAL 2 TIMES DAILY
COMMUNITY

## 2023-08-08 RX ORDER — FERROUS SULFATE 325(65) MG
325 TABLET ORAL
COMMUNITY

## 2023-08-08 NOTE — PROGRESS NOTES
headaches  Psychiatric: no depression or anxiety  Endocrine: no heat or cold intolerance, no polyuria or polydipsia    Physical Examination:  Blood pressure 102/65, pulse 51, height 5' 5\" (1.651 m), weight 158 lb 3.2 oz (71.8 kg). General: pleasant, no distress, good eye contact  HEENT: no exopthalmos, no periorbital edema, no scleral/conjunctival injection, EOMI, no lid lag or stare  Neck: supple, + thyroid nodule in the isthmus, no LAD, not thyroid or carotid bruits, no supraclavicular or dorsocervical fat pads  Cardiovascular: regular, normal rate, normal S1 and S2, no murmurs/rubs/gallops   Respiratory: clear to auscultation bilaterally  Gastrointestinal: soft, nontender, nondistended, no masses, no hepatosplenomegaly  Musculoskeletal: no proximal muscle weakness in upper or lower extremities  Integumentary: no acanthosis nigricans, no abdominal striae, no rashes, no edema  Neurological: reflexes 2+ at biceps, no tremor  Psychiatric: normal mood and affect    Data Reviewed:   Component      Latest Ref Rng & Units 10/4/2022 4/25/2022          11:11 AM 10:40 AM   TSH      0.450 - 4.500 uIU/mL 0.245 (L) 0.501     - thyroid ultrasound report  EXAM:  US THYROID/PARATHYROID/SOFT TISS     INDICATION:  Thyromegaly     COMPARISON:  3/18/2017     TECHNIQUE:  Real time and color Doppler ultrasound of the thyroid gland. FINDINGS:  The right thyroid lobe measures 6.6 x 2.2 x 1.8 cm. The left thyroid  lobe measures 6.5 x 2.6 x 1.8 cm. The isthmus measures 1.0 cm in AP thickness. The thyroid gland is diffusely enlarged and heterogeneous with normal  vascularity. A mixed solid and cystic isthmus nodule measures 3.7 x 1.9 x 3.0  cm, previously 2.2 x 1.2 x 2.1 cm. There is no right or left neck lymphadenopathy. IMPRESSION:  Mild increase in size of a mixed solid and cystic isthmus nodule since 2017. No  new thyroid nodule. Stable thyromegaly. Assessment/Plan:   1. Thyroid nodule    2.  Low TSH level    1) We

## 2023-08-09 LAB
T4 FREE SERPL-MCNC: 0.8 NG/DL (ref 0.8–1.5)
TSH SERPL DL<=0.05 MIU/L-ACNC: 0.31 UIU/ML (ref 0.36–3.74)

## 2023-08-31 ENCOUNTER — TELEPHONE (OUTPATIENT)
Age: 38
End: 2023-08-31

## 2023-08-31 NOTE — TELEPHONE ENCOUNTER
Spoke with patient and relayed the unread [employer] message with her. She states will call central scheduling to schedule her ultrasound. Gave patient the phone number. Advised her to also call the [employer] help desk to deactivate her account if she does not plan to use [employer]. Patient expressed understanding.

## 2023-09-08 ENCOUNTER — HOSPITAL ENCOUNTER (OUTPATIENT)
Facility: HOSPITAL | Age: 38
Discharge: HOME | End: 2023-09-08
Attending: INTERNAL MEDICINE
Payer: MEDICAID

## 2023-09-08 DIAGNOSIS — E04.1 THYROID NODULE: ICD-10-CM

## 2023-09-08 PROCEDURE — 76536 US EXAM OF HEAD AND NECK: CPT

## 2023-09-11 DIAGNOSIS — E05.90 SUBCLINICAL HYPERTHYROIDISM: ICD-10-CM

## 2023-09-11 DIAGNOSIS — E04.1 THYROID NODULE: Primary | ICD-10-CM

## (undated) DEVICE — TUBING HYDR IRR --

## (undated) DEVICE — FORCEPS BX L240CM JAW DIA2.8MM L CAP W/ NDL MIC MESH TOOTH